# Patient Record
Sex: FEMALE | Race: WHITE | NOT HISPANIC OR LATINO | Employment: FULL TIME | URBAN - METROPOLITAN AREA
[De-identification: names, ages, dates, MRNs, and addresses within clinical notes are randomized per-mention and may not be internally consistent; named-entity substitution may affect disease eponyms.]

---

## 2017-06-04 ENCOUNTER — APPOINTMENT (OUTPATIENT)
Dept: CT IMAGING | Facility: HOSPITAL | Age: 37
End: 2017-06-04

## 2017-06-04 ENCOUNTER — HOSPITAL ENCOUNTER (EMERGENCY)
Facility: HOSPITAL | Age: 37
Discharge: HOME | End: 2017-06-04
Attending: EMERGENCY MEDICINE | Admitting: EMERGENCY MEDICINE

## 2017-06-04 VITALS
DIASTOLIC BLOOD PRESSURE: 81 MMHG | HEART RATE: 63 BPM | SYSTOLIC BLOOD PRESSURE: 116 MMHG | WEIGHT: 135 LBS | HEIGHT: 62 IN | BODY MASS INDEX: 24.84 KG/M2 | RESPIRATION RATE: 18 BRPM | TEMPERATURE: 98.2 F | OXYGEN SATURATION: 100 %

## 2017-06-04 DIAGNOSIS — N20.0 KIDNEY STONE ON LEFT SIDE: Primary | ICD-10-CM

## 2017-06-04 LAB
ALBUMIN SERPL-MCNC: 4.1 G/DL (ref 3.5–5.2)
ALBUMIN/GLOB SERPL: 1.4 G/DL
ALP SERPL-CCNC: 59 U/L (ref 40–129)
ALT SERPL W P-5'-P-CCNC: 12 U/L (ref 5–33)
ANION GAP SERPL CALCULATED.3IONS-SCNC: 11 MMOL/L
AST SERPL-CCNC: 15 U/L (ref 5–32)
BACTERIA UR QL AUTO: ABNORMAL /HPF
BASOPHILS # BLD AUTO: 0.05 10*3/MM3 (ref 0–0.2)
BASOPHILS NFR BLD AUTO: 0.8 % (ref 0–2)
BILIRUB SERPL-MCNC: 0.3 MG/DL (ref 0.2–1.2)
BILIRUB UR QL STRIP: NEGATIVE
BUN BLD-MCNC: 12 MG/DL (ref 6–20)
BUN/CREAT SERPL: 16.9 (ref 7–25)
CALCIUM SPEC-SCNC: 8.5 MG/DL (ref 8.6–10.5)
CHLORIDE SERPL-SCNC: 105 MMOL/L (ref 98–107)
CLARITY UR: CLEAR
CO2 SERPL-SCNC: 24 MMOL/L (ref 22–29)
COLOR UR: YELLOW
CREAT BLD-MCNC: 0.71 MG/DL (ref 0.57–1)
DEPRECATED RDW RBC AUTO: 38 FL (ref 37–54)
EOSINOPHIL # BLD AUTO: 0.51 10*3/MM3 (ref 0.1–0.3)
EOSINOPHIL NFR BLD AUTO: 8.5 % (ref 0–4)
ERYTHROCYTE [DISTWIDTH] IN BLOOD BY AUTOMATED COUNT: 11.9 % (ref 11.5–14.5)
GFR SERPL CREATININE-BSD FRML MDRD: 92 ML/MIN/1.73
GLOBULIN UR ELPH-MCNC: 3 GM/DL
GLUCOSE BLD-MCNC: 109 MG/DL (ref 65–99)
GLUCOSE UR STRIP-MCNC: NEGATIVE MG/DL
HCG SERPL QL: NEGATIVE
HCT VFR BLD AUTO: 37.9 % (ref 37–47)
HGB BLD-MCNC: 12.1 G/DL (ref 12–16)
HGB UR QL STRIP.AUTO: ABNORMAL
HYALINE CASTS UR QL AUTO: ABNORMAL /LPF
IMM GRANULOCYTES # BLD: 0.02 10*3/MM3 (ref 0–0.03)
IMM GRANULOCYTES NFR BLD: 0.3 % (ref 0–0.5)
KETONES UR QL STRIP: NEGATIVE
LEUKOCYTE ESTERASE UR QL STRIP.AUTO: NEGATIVE
LIPASE SERPL-CCNC: 45 U/L (ref 13–60)
LYMPHOCYTES # BLD AUTO: 2.23 10*3/MM3 (ref 0.6–4.8)
LYMPHOCYTES NFR BLD AUTO: 37.1 % (ref 20–45)
MCH RBC QN AUTO: 27.7 PG (ref 27–31)
MCHC RBC AUTO-ENTMCNC: 31.9 G/DL (ref 31–37)
MCV RBC AUTO: 86.7 FL (ref 81–99)
MONOCYTES # BLD AUTO: 0.4 10*3/MM3 (ref 0–1)
MONOCYTES NFR BLD AUTO: 6.7 % (ref 3–8)
NEUTROPHILS # BLD AUTO: 2.8 10*3/MM3 (ref 1.5–8.3)
NEUTROPHILS NFR BLD AUTO: 46.6 % (ref 45–70)
NITRITE UR QL STRIP: NEGATIVE
NRBC BLD MANUAL-RTO: 0 /100 WBC (ref 0–0)
PH UR STRIP.AUTO: 7.5 [PH] (ref 4.5–8)
PLATELET # BLD AUTO: 300 10*3/MM3 (ref 140–500)
PMV BLD AUTO: 10.2 FL (ref 7.4–10.4)
POTASSIUM BLD-SCNC: 3.5 MMOL/L (ref 3.5–5.2)
PROT SERPL-MCNC: 7.1 G/DL (ref 6–8.5)
PROT UR QL STRIP: ABNORMAL
RBC # BLD AUTO: 4.37 10*6/MM3 (ref 4.2–5.4)
RBC # UR: ABNORMAL /HPF
REF LAB TEST METHOD: ABNORMAL
SODIUM BLD-SCNC: 140 MMOL/L (ref 136–145)
SP GR UR STRIP: 1.02 (ref 1–1.03)
SQUAMOUS #/AREA URNS HPF: ABNORMAL /HPF
UROBILINOGEN UR QL STRIP: ABNORMAL
WBC NRBC COR # BLD: 6.01 10*3/MM3 (ref 4.8–10.8)
WBC UR QL AUTO: ABNORMAL /HPF

## 2017-06-04 PROCEDURE — 80053 COMPREHEN METABOLIC PANEL: CPT | Performed by: EMERGENCY MEDICINE

## 2017-06-04 PROCEDURE — 83690 ASSAY OF LIPASE: CPT | Performed by: EMERGENCY MEDICINE

## 2017-06-04 PROCEDURE — 96376 TX/PRO/DX INJ SAME DRUG ADON: CPT

## 2017-06-04 PROCEDURE — 84703 CHORIONIC GONADOTROPIN ASSAY: CPT | Performed by: EMERGENCY MEDICINE

## 2017-06-04 PROCEDURE — 25010000002 FENTANYL CITRATE (PF) 100 MCG/2ML SOLUTION: Performed by: EMERGENCY MEDICINE

## 2017-06-04 PROCEDURE — 85025 COMPLETE CBC W/AUTO DIFF WBC: CPT | Performed by: EMERGENCY MEDICINE

## 2017-06-04 PROCEDURE — 96375 TX/PRO/DX INJ NEW DRUG ADDON: CPT

## 2017-06-04 PROCEDURE — 74176 CT ABD & PELVIS W/O CONTRAST: CPT

## 2017-06-04 PROCEDURE — 25010000002 ONDANSETRON PER 1 MG: Performed by: EMERGENCY MEDICINE

## 2017-06-04 PROCEDURE — 25010000002 KETOROLAC TROMETHAMINE PER 15 MG: Performed by: EMERGENCY MEDICINE

## 2017-06-04 PROCEDURE — 81001 URINALYSIS AUTO W/SCOPE: CPT | Performed by: EMERGENCY MEDICINE

## 2017-06-04 PROCEDURE — 96374 THER/PROPH/DIAG INJ IV PUSH: CPT

## 2017-06-04 PROCEDURE — 99284 EMERGENCY DEPT VISIT MOD MDM: CPT | Performed by: EMERGENCY MEDICINE

## 2017-06-04 PROCEDURE — 99283 EMERGENCY DEPT VISIT LOW MDM: CPT

## 2017-06-04 RX ORDER — HYDROCODONE BITARTRATE AND ACETAMINOPHEN 5; 325 MG/1; MG/1
1-2 TABLET ORAL EVERY 4 HOURS PRN
Qty: 24 TABLET | Refills: 0 | Status: SHIPPED | OUTPATIENT
Start: 2017-06-04 | End: 2021-01-15

## 2017-06-04 RX ORDER — FENTANYL CITRATE 50 UG/ML
INJECTION, SOLUTION INTRAMUSCULAR; INTRAVENOUS
Status: DISCONTINUED
Start: 2017-06-04 | End: 2017-06-04 | Stop reason: HOSPADM

## 2017-06-04 RX ORDER — KETOROLAC TROMETHAMINE 30 MG/ML
30 INJECTION, SOLUTION INTRAMUSCULAR; INTRAVENOUS ONCE
Status: COMPLETED | OUTPATIENT
Start: 2017-06-04 | End: 2017-06-04

## 2017-06-04 RX ORDER — ONDANSETRON 4 MG/1
4 TABLET, ORALLY DISINTEGRATING ORAL 4 TIMES DAILY PRN
Qty: 12 TABLET | Refills: 0 | Status: SHIPPED | OUTPATIENT
Start: 2017-06-04 | End: 2021-01-15

## 2017-06-04 RX ORDER — DICLOFENAC SODIUM 75 MG/1
75 TABLET, DELAYED RELEASE ORAL 2 TIMES DAILY PRN
Qty: 20 TABLET | Refills: 0 | Status: SHIPPED | OUTPATIENT
Start: 2017-06-04 | End: 2021-01-15

## 2017-06-04 RX ORDER — SODIUM CHLORIDE 0.9 % (FLUSH) 0.9 %
10 SYRINGE (ML) INJECTION AS NEEDED
Status: DISCONTINUED | OUTPATIENT
Start: 2017-06-04 | End: 2017-06-04 | Stop reason: HOSPADM

## 2017-06-04 RX ORDER — FENTANYL CITRATE 50 UG/ML
50 INJECTION, SOLUTION INTRAMUSCULAR; INTRAVENOUS ONCE
Status: COMPLETED | OUTPATIENT
Start: 2017-06-04 | End: 2017-06-04

## 2017-06-04 RX ORDER — FENTANYL CITRATE 50 UG/ML
25 INJECTION, SOLUTION INTRAMUSCULAR; INTRAVENOUS ONCE
Status: COMPLETED | OUTPATIENT
Start: 2017-06-04 | End: 2017-06-04

## 2017-06-04 RX ORDER — CIPROFLOXACIN 250 MG/1
250 TABLET, FILM COATED ORAL 2 TIMES DAILY
Qty: 20 TABLET | Refills: 0 | Status: SHIPPED | OUTPATIENT
Start: 2017-06-04 | End: 2017-06-14

## 2017-06-04 RX ORDER — ONDANSETRON 2 MG/ML
4 INJECTION INTRAMUSCULAR; INTRAVENOUS ONCE
Status: COMPLETED | OUTPATIENT
Start: 2017-06-04 | End: 2017-06-04

## 2017-06-04 RX ADMIN — FENTANYL CITRATE 25 MCG: 50 INJECTION, SOLUTION INTRAMUSCULAR; INTRAVENOUS at 10:05

## 2017-06-04 RX ADMIN — KETOROLAC TROMETHAMINE 30 MG: 30 INJECTION INTRAMUSCULAR; INTRAVENOUS at 08:59

## 2017-06-04 RX ADMIN — ONDANSETRON 4 MG: 2 INJECTION, SOLUTION INTRAMUSCULAR; INTRAVENOUS at 08:10

## 2017-06-04 RX ADMIN — FENTANYL CITRATE 25 MCG: 50 INJECTION, SOLUTION INTRAMUSCULAR; INTRAVENOUS at 08:18

## 2017-06-04 NOTE — ED PROVIDER NOTES
"Subjective     History provided by:  Patient and spouse    History of Present Illness    · Chief complaint: Left flank and left abdominal pain    · Location: Flank and left lower quadrant of the abdomen    · Quality/Severity: Severe pain    · Timing/Onset: The pain began suddenly about an hour ago    · Modifying Factors: Walking exacerbates pain    · Associated symptoms: She denies any associated nausea, vomiting, or diarrhea.  She states she just got off her menstrual period and is unsure if she has any blood in her urine.    · Narrative: The patient is a 37-year-old white female complaining of a 1 hour onset is left flank and left lower quadrant abdominal pain.  The patient believes she has a kidney stone, but states she's never had a kidney stone before.  She denies a history of similar pain in the past.  She is sexually active without birth control, but states she's not attempting to get pregnant.  Her last menstrual period just finished a day ago.  The patient states that she is \"hypersensitive to pain medications\".    ED Triage Vitals   Temp Heart Rate Resp BP SpO2   06/04/17 0753 06/04/17 0753 06/04/17 0753 06/04/17 0753 06/04/17 0753   97.7 °F (36.5 °C) 54 18 138/96 100 %      Temp src Heart Rate Source Patient Position BP Location FiO2 (%)   06/04/17 0753 06/04/17 0753 06/04/17 0753 06/04/17 0753 --   Oral Monitor Sitting Right arm        Review of Systems   Constitutional: Negative for activity change, appetite change, chills, diaphoresis, fatigue and fever.   HENT: Negative for congestion, dental problem, ear pain, hearing loss, mouth sores, postnasal drip, rhinorrhea, sinus pressure, sore throat and voice change.    Eyes: Negative for photophobia, pain, discharge, redness and visual disturbance.   Respiratory: Negative for cough, chest tightness, shortness of breath, wheezing and stridor.    Cardiovascular: Negative for chest pain, palpitations and leg swelling.   Gastrointestinal: Positive for abdominal " pain. Negative for blood in stool, constipation, diarrhea, nausea and vomiting.   Genitourinary: Positive for flank pain. Negative for difficulty urinating, dysuria, frequency, hematuria, pelvic pain, urgency, vaginal bleeding and vaginal discharge.   Musculoskeletal: Negative for arthralgias, back pain, gait problem, joint swelling, myalgias, neck pain and neck stiffness.   Skin: Negative for color change and rash.   Neurological: Negative for dizziness, tremors, seizures, syncope, facial asymmetry, speech difficulty, weakness, light-headedness, numbness and headaches.   Hematological: Negative for adenopathy.   Psychiatric/Behavioral: Negative.  Negative for confusion and decreased concentration. The patient is not nervous/anxious.        No past medical history on file.    Allergies   Allergen Reactions   • Ceclor [Cefaclor]    • Percocet [Oxycodone-Acetaminophen] Hives   • Sulfa Antibiotics        Past Surgical History:   Procedure Laterality Date   • ESOPHAGEAL DILATATION         No family history on file.    Social History     Social History   • Marital status:      Spouse name: N/A   • Number of children: N/A   • Years of education: N/A     Social History Main Topics   • Smoking status: Never Smoker   • Smokeless tobacco: Not on file   • Alcohol use No   • Drug use: No   • Sexual activity: Yes     Partners: Male     Birth control/ protection: None     Other Topics Concern   • Not on file     Social History Narrative   • No narrative on file           Objective   Physical Exam   Constitutional: She is oriented to person, place, and time. She appears well-developed and well-nourished. She appears distressed.   The patient appears in discomfort, but does not appear toxic.   HENT:   Head: Normocephalic and atraumatic.   Nose: Nose normal.   Mouth/Throat: Oropharynx is clear and moist. No oropharyngeal exudate.   Eyes: EOM are normal. Pupils are equal, round, and reactive to light. Right eye exhibits no  discharge. Left eye exhibits no discharge. No scleral icterus.   Neck: Normal range of motion. Neck supple. No JVD present. No thyromegaly present.   Cardiovascular: Normal rate, regular rhythm and normal heart sounds.    No murmur heard.  Pulmonary/Chest: Effort normal and breath sounds normal. She has no wheezes. She has no rales. She exhibits no tenderness.   Abdominal: Soft. Bowel sounds are normal. She exhibits no distension. There is tenderness. There is no rebound and no guarding.   The patient's abdomen is soft but she has marked left lower quadrant abdominal tenderness.   Musculoskeletal: Normal range of motion. She exhibits no edema, tenderness or deformity.   Lymphadenopathy:     She has no cervical adenopathy.   Neurological: She is alert and oriented to person, place, and time. No cranial nerve deficit. Coordination normal.   No focal motor sensory deficit   Skin: Skin is warm and dry. No rash noted. She is not diaphoretic.   Psychiatric: Her behavior is normal. Judgment and thought content normal.   Nursing note and vitals reviewed.      Procedures         ED Course  ED Course   Comment By Time   Zofran 4mg and Fentanyl 50 mcg IV given in two 25mcg increments. Fabiano Pan MD 06/04 0816   [facility] Report [id number] Fabiano Pan MD 06/04 0820   09:00  the patient is still in pain, Toradol 30 mg IV. Fabiano Pan MD 06/04 0908   09:52   I informed the patient of her CT results showing a left distal ureter kidney stone, and recommended she follow-up with either Dr. Gunter or Dr. Mukherjee.  The patient still in significant discomfort - fentanyl 25 µg IV ordered. Fabiano Pan MD 06/04 0991   10:22  patient reports pain is better and she is ready for discharge. Fabiano Pan MD 06/04 1031                  MDM  Number of Diagnoses or Management Options  Kidney stone on left side: new and requires workup     Amount and/or Complexity of Data Reviewed  Clinical lab tests: ordered and reviewed  Tests in  the radiology section of CPT®: ordered and reviewed  Independent visualization of images, tracings, or specimens: yes    Risk of Complications, Morbidity, and/or Mortality  Presenting problems: high  Diagnostic procedures: high  Management options: high  General comments: My differential diagnosis for abdominal pain includes but is not limited to:  Gastritis, gastroenteritis, peptic ulcer disease, GERD, esophageal perforation, acute appendicitis, mesenteric adenitis, Meckel’s diverticulum, epiploic appendagitis, diverticulitis, colon cancer, ulcerative colitis, Crohn’s disease, intussusception, small bowel obstruction, adhesions, ischemic bowel, perforated viscus, ileus, obstipation, biliary colic, cholecystitis, cholelithiasis, Papa-Spike Pasquale, hepatitis, pancreatitis, common bile duct obstruction, cholangitis, bile leak, splenic trauma, splenic rupture, splenic infarction, splenic abscess, abdominal abscess, ascites, spontaneous bacterial peritonitis, hernia, UTI, cystitis, prostatitis, ureterolithiasis, urinary obstruction, ovarian cyst, torsion, pregnancy, ectopic pregnancy, PID, pelvic abscess, mittelschmerz, endometriosis, AAA, myocardial infarction, pneumonia, cancer, porphyria, DKA, medications, sickle cell, viral syndrome, zoster    Patient Progress  Patient progress: improved      Final diagnoses:   Kidney stone on left side           Labs Reviewed   URINALYSIS W/ CULTURE IF INDICATED - Abnormal; Notable for the following:        Result Value    Blood, UA Large (3+) (*)     Protein, UA 30 mg/dL (1+) (*)     All other components within normal limits   COMPREHENSIVE METABOLIC PANEL - Abnormal; Notable for the following:     Glucose 109 (*)     Calcium 8.5 (*)     All other components within normal limits   CBC WITH AUTO DIFFERENTIAL - Abnormal; Notable for the following:     Eosinophil % 8.5 (*)     Eosinophils, Absolute 0.51 (*)     All other components within normal limits   URINALYSIS, MICROSCOPIC ONLY -  Abnormal; Notable for the following:     RBC, UA 21-30 (*)     All other components within normal limits   LIPASE - Normal   HCG, SERUM, QUALITATIVE - Normal   CBC AND DIFFERENTIAL    Narrative:     The following orders were created for panel order CBC & Differential.  Procedure                               Abnormality         Status                     ---------                               -----------         ------                     CBC Auto Differential[[id number]]        Abnormal            Final result                 Please view results for these tests on the individual orders.     CT Abdomen Pelvis Without Contrast   ED Interpretation   1. Mild left hydronephrosis secondary to a 2 mm stone in the distal left   ureter. No other stones are identified.   2. Normal unopacified GI tract, including the appendix.       This report was finalized on 6/4/2017 9:37 AM by Dr. Librado Stewart MD.          Final Result   1. Mild left hydronephrosis secondary to a 2 mm stone in the distal left   ureter. No other stones are identified.   2. Normal unopacified GI tract, including the appendix.       This report was finalized on 6/4/2017 9:37 AM by Dr. Librado Stewart MD.                 Medication List      New Prescriptions          ciprofloxacin 250 MG tablet   Commonly known as:  CIPRO   Take 1 tablet by mouth 2 (Two) Times a Day for 10 days.       diclofenac 75 MG EC tablet   Commonly known as:  VOLTAREN   Take 1 tablet by mouth 2 (Two) Times a Day As Needed (Pain) for up to 20   doses.       HYDROcodone-acetaminophen 5-325 MG per tablet   Commonly known as:  NORCO   Take 1-2 tablets by mouth Every 4 (Four) Hours As Needed for Severe Pain   (7-10) for up to 24 doses.       ondansetron ODT 4 MG disintegrating tablet   Commonly known as:  ZOFRAN-ODT   Take 1 tablet by mouth 4 (Four) Times a Day As Needed for Nausea or   Vomiting for up to 12 doses.                Fabiano Pan MD  06/04/17 3013

## 2019-09-27 ENCOUNTER — APPOINTMENT (OUTPATIENT)
Dept: WOMENS IMAGING | Facility: HOSPITAL | Age: 39
End: 2019-09-27

## 2019-09-27 PROCEDURE — 77067 SCR MAMMO BI INCL CAD: CPT | Performed by: RADIOLOGY

## 2019-09-27 PROCEDURE — 77063 BREAST TOMOSYNTHESIS BI: CPT | Performed by: RADIOLOGY

## 2019-10-07 ENCOUNTER — APPOINTMENT (OUTPATIENT)
Dept: WOMENS IMAGING | Facility: HOSPITAL | Age: 39
End: 2019-10-07

## 2019-10-07 PROCEDURE — 76641 ULTRASOUND BREAST COMPLETE: CPT | Performed by: RADIOLOGY

## 2019-10-07 PROCEDURE — G0279 TOMOSYNTHESIS, MAMMO: HCPCS | Performed by: RADIOLOGY

## 2019-10-07 PROCEDURE — 77061 BREAST TOMOSYNTHESIS UNI: CPT | Performed by: RADIOLOGY

## 2019-10-07 PROCEDURE — 77065 DX MAMMO INCL CAD UNI: CPT | Performed by: RADIOLOGY

## 2020-04-25 ENCOUNTER — HOSPITAL ENCOUNTER (EMERGENCY)
Facility: HOSPITAL | Age: 40
Discharge: HOME | End: 2020-04-25
Attending: EMERGENCY MEDICINE | Admitting: EMERGENCY MEDICINE

## 2020-04-25 VITALS
BODY MASS INDEX: 24.69 KG/M2 | SYSTOLIC BLOOD PRESSURE: 118 MMHG | TEMPERATURE: 96.9 F | HEIGHT: 62 IN | HEART RATE: 78 BPM | DIASTOLIC BLOOD PRESSURE: 80 MMHG | OXYGEN SATURATION: 100 % | RESPIRATION RATE: 16 BRPM

## 2020-04-25 DIAGNOSIS — F32.1 CURRENT MODERATE EPISODE OF MAJOR DEPRESSIVE DISORDER WITHOUT PRIOR EPISODE (HCC): Primary | ICD-10-CM

## 2020-04-25 LAB
AMPHET+METHAMPHET UR QL: POSITIVE
BARBITURATES UR QL SCN: NEGATIVE
BENZODIAZ UR QL SCN: NEGATIVE
CANNABINOIDS SERPL QL: NEGATIVE
COCAINE UR QL: NEGATIVE
ETHANOL BLD-MCNC: <10 MG/DL (ref 0–10)
ETHANOL UR QL: <0.01 %
HCG SERPL QL: NEGATIVE
HOLD SPECIMEN: NORMAL
METHADONE UR QL SCN: NEGATIVE
OPIATES UR QL: NEGATIVE
OXYCODONE UR QL SCN: NEGATIVE
WHOLE BLOOD HOLD SPECIMEN: NORMAL

## 2020-04-25 PROCEDURE — 80307 DRUG TEST PRSMV CHEM ANLYZR: CPT

## 2020-04-25 PROCEDURE — 84703 CHORIONIC GONADOTROPIN ASSAY: CPT

## 2020-04-25 PROCEDURE — 99283 EMERGENCY DEPT VISIT LOW MDM: CPT
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
## 2020-04-25 NOTE — ED PROVIDER NOTES
EMERGENCY DEPARTMENT ENCOUNTER    Room Number:  10/10  Date of encounter:  4/25/2020  PCP: María Martino APRN  Historian: Patient      HPI:  Chief Complaint: Depression  A complete HPI/ROS/PMH/PSH/SH/FH are unobtainable due to: Nothing    Context: Vianey Bonilla is a 39 y.o. female who presents to the ED c/o severe depression related to recent marital and child custody problems.  Patient says she has been getting increasingly depressed and it is now severe.  She is having trouble sleeping, not eating and drinking much, having trouble focusing and also having severe anxiety.  She has no suicidal ideations, and appears very cooperative and is here under her own discretion.      PAST MEDICAL HISTORY  Active Ambulatory Problems     Diagnosis Date Noted   • No Active Ambulatory Problems     Resolved Ambulatory Problems     Diagnosis Date Noted   • No Resolved Ambulatory Problems     No Additional Past Medical History         PAST SURGICAL HISTORY  Past Surgical History:   Procedure Laterality Date   • ESOPHAGEAL DILATATION           FAMILY HISTORY  No family history on file.      SOCIAL HISTORY  Social History     Socioeconomic History   • Marital status:      Spouse name: Not on file   • Number of children: Not on file   • Years of education: Not on file   • Highest education level: Not on file   Tobacco Use   • Smoking status: Never Smoker   Substance and Sexual Activity   • Alcohol use: No   • Drug use: No   • Sexual activity: Yes     Partners: Male     Birth control/protection: None         ALLERGIES  Ceclor [cefaclor]; Percocet [oxycodone-acetaminophen]; and Sulfa antibiotics        REVIEW OF SYSTEMS  Review of Systems     All systems reviewed and negative except for those discussed in HPI.       PHYSICAL EXAM    I have reviewed the triage vital signs and nursing notes.    ED Triage Vitals [04/25/20 1425]   Temp Heart Rate Resp BP SpO2   96.9 °F (36.1 °C) 90 16 -- 100 %      Temp src Heart Rate  Source Patient Position BP Location FiO2 (%)   Tympanic Monitor -- -- --       Physical Exam  GENERAL: Emotional, but cooperative  HENT: nares patent  EYES: no scleral icterus  CV: regular rhythm, regular rate  RESPIRATORY: normal effort  ABDOMEN: soft  MUSCULOSKELETAL: no deformity  NEURO: alert, moves all extremities, follows commands.  No acute suicidal ideations.  Although the patient is depressed and going through a lot of stress, she does appear to be very thoughtful and self-aware.  SKIN: warm, dry        LAB RESULTS  Recent Results (from the past 24 hour(s))   Ethanol    Collection Time: 04/25/20  2:42 PM   Result Value Ref Range    Ethanol <10 0 - 10 mg/dL    Ethanol % <0.010 %   Urine Drug Screen - Urine, Clean Catch    Collection Time: 04/25/20  2:42 PM   Result Value Ref Range    Amphet/Methamphet, Screen Positive (A) Negative    Barbiturates Screen, Urine Negative Negative    Benzodiazepine Screen, Urine Negative Negative    Cocaine Screen, Urine Negative Negative    Opiate Screen Negative Negative    THC, Screen, Urine Negative Negative    Methadone Screen, Urine Negative Negative    Oxycodone Screen, Urine Negative Negative   hCG, Serum, Qualitative    Collection Time: 04/25/20  2:42 PM   Result Value Ref Range    HCG Qualitative Negative Negative   Green Top (Gel)    Collection Time: 04/25/20  2:42 PM   Result Value Ref Range    Extra Tube Hold for add-ons.    Lavender Top    Collection Time: 04/25/20  2:42 PM   Result Value Ref Range    Extra Tube hold for add-on        Ordered the above labs and independently reviewed the results.        RADIOLOGY  No Radiology Exams Resulted Within Past 24 Hours    I ordered the above noted radiological studies. Reviewed by me and discussed with radiologist.  See dictation for official radiology interpretation.      PROCEDURES    Procedures      MEDICATIONS GIVEN IN ER    Medications - No data to display      PROGRESS, DATA ANALYSIS, CONSULTS, AND MEDICAL DECISION  MAKING    All labs have been independently reviewed by me.  All radiology studies have been reviewed by me and discussed with radiologist dictating the report.   EKG's independently viewed and interpreted by me.  Discussion below represents my analysis of pertinent findings related to patient's condition, differential diagnosis, treatment plan and final disposition.        ED Course as of Apr 25 2345   Sat Apr 25, 2020 2344 hCG negative, EtOH negative, UDS positive for amphetamines.  She does not appear to be acutely intoxicated with stimulant.    Patient has been evaluated by the Access team and they feel she is stable for discharge home in intensive outpatient therapy.  Patient is agreeable with this and that seems like a reasonable plan    [DP]      ED Course User Index  [DP] Ugo Pa MD               AS OF 23:45 VITALS:    BP - 118/80  HR - 78  TEMP - 96.9 °F (36.1 °C) (Tympanic)  O2 SATS - 100%        DIAGNOSIS  Final diagnoses:   Current moderate episode of major depressive disorder without prior episode (CMS/[facility])         DISPOSITION  Discharge           Ugo Pa MD  04/25/20 0163

## 2020-04-25 NOTE — ED TRIAGE NOTES
Pt seeking behavioral health evaluation for depression, trouble sleeping, poor PO intake, lack of focus and anxiety. Denies SI/HI. Pt states that she is going through a divorce. Mask applied at triage.
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
## 2020-04-27 ENCOUNTER — OFFICE VISIT (OUTPATIENT)
Dept: PSYCHIATRY | Facility: HOSPITAL | Age: 40
End: 2020-04-27

## 2020-04-27 DIAGNOSIS — F33.1 MODERATE EPISODE OF RECURRENT MAJOR DEPRESSIVE DISORDER (HCC): Primary | ICD-10-CM
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
## 2020-04-27 NOTE — PROGRESS NOTES
"Wrap-up  Mood at 5 with 10 being the worse and found \"hearing I am not alone\"  As most helpful today.    Feeling sad or empty   Easily tearing up/crying  Increased nervous feelings/anxiety    No current SI    Good participation    Has appropriate goal for later today  "

## 2020-04-28 ENCOUNTER — OFFICE VISIT (OUTPATIENT)
Dept: PSYCHIATRY | Facility: HOSPITAL | Age: 40
End: 2020-04-28

## 2020-04-28 DIAGNOSIS — F33.1 MODERATE EPISODE OF RECURRENT MAJOR DEPRESSIVE DISORDER (HCC): Primary | ICD-10-CM

## 2020-04-28 PROCEDURE — S9480 INTENSIVE OUTPATIENT PSYCHIA: HCPCS | Performed by: COUNSELOR

## 2020-04-28 NOTE — PROGRESS NOTES
Other     Information/activity     2810-8400 Art Therapy - Therapeutic story [employer] [facility] [employer], creation of image of story and processing    Instructor Hermilo Sotelo, [facility]     14:16     Patient Response Good participation

## 2020-04-28 NOTE — PROGRESS NOTES
Wrap-up  Mood at 7  with 10 being the worse and found group therapy to be most helpful today    Reporting no depressive symptoms today.    No SI    Has appropriate goals for later today

## 2020-04-28 NOTE — PROGRESS NOTES
Group therapy [id number]  Pt was very engaged both giving and receiving feedback today.  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   Pt also discussed a stressful situation involving her Dad possibly starting a new position where he might be more exposed to Covid-19.

## 2020-04-29 ENCOUNTER — OFFICE VISIT (OUTPATIENT)
Dept: PSYCHIATRY | Facility: HOSPITAL | Age: 40
End: 2020-04-29

## 2020-04-29 DIAGNOSIS — F33.1 MODERATE EPISODE OF RECURRENT MAJOR DEPRESSIVE DISORDER (HCC): Primary | ICD-10-CM
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
## 2020-04-29 NOTE — PROGRESS NOTES
Group therapy [id number]  Mood at 8 with 10 being the worse amd found group therapy to be most helpful    Described no depressive symptoms today    No SI    Appropriate goals for later today

## 2020-04-29 NOTE — PROGRESS NOTES
"Group therapy [id number]  Pt identified a myriad of feelings today as she discussed enduring many challenging during her 20 year relationship with her .  \" I was there for him during his depression and h losses of his parents.  Now when I need him, he is abandoning me.\"  Pt was attentive and offered words of encouragement to other group members today.  "

## 2020-04-29 NOTE — PROGRESS NOTES
Mental Health Awareness Class   (10:30am-11:30am)  Information/activity     Managing Stress    Instructor Jessica Joel LCSW     13:52     Patient Response Good participation

## 2020-04-30 ENCOUNTER — OFFICE VISIT (OUTPATIENT)
Dept: PSYCHIATRY | Facility: HOSPITAL | Age: 40
End: 2020-04-30

## 2020-04-30 DIAGNOSIS — F33.1 MODERATE EPISODE OF RECURRENT MAJOR DEPRESSIVE DISORDER (HCC): Primary | ICD-10-CM

## 2020-04-30 PROCEDURE — S9480 INTENSIVE OUTPATIENT PSYCHIA: HCPCS

## 2020-04-30 NOTE — PROGRESS NOTES
Wrap Up:  Mood at 9 with 10 being the worse and reports group was most helpful today.  Pt reports no depressive symptoms and no SI.  Pt has appropriate goals for the day.  May be out tomorrow for a scheduled court date but plans to return on Monday 5/4.

## 2020-04-30 NOTE — PROGRESS NOTES
"Group Note:  Pt shared of a court date scheduled for tomorrow related to the DVO, but her  has COVID symptoms.  Pt shared feeling sad that she has not seen her children in several months, but is tired of being the \"bad agustín\" in her relationship with her .  Pt is scheduled to begin anger management classes next week and she is hopeful that IOP and the classes will help her be a better mother and person.  Encouraged pt to focus on things she can control right now.  Pt wants to be supportive of peers, but is quick to provide advice and solutions.  Encouraged pt to use \"I\" statements, and will continue to encourage connecting to other's feelings.    "

## 2020-05-01 ENCOUNTER — OFFICE VISIT (OUTPATIENT)
Dept: PSYCHIATRY | Facility: HOSPITAL | Age: 40
End: 2020-05-01

## 2020-05-01 DIAGNOSIS — F33.1 MODERATE EPISODE OF RECURRENT MAJOR DEPRESSIVE DISORDER (HCC): Primary | ICD-10-CM

## 2020-05-01 PROCEDURE — S9480 INTENSIVE OUTPATIENT PSYCHIA: HCPCS

## 2020-05-01 NOTE — PROGRESS NOTES
Wrap Up:  Mood at 2 with 10 being the worse and reports group was most helpful today.  Pt reports following depressive symptoms:    Easily tearing up/crying     Pt denies SI and has appropriate goals for the day.  Plans to return on Monday 5/4.

## 2020-05-01 NOTE — PROGRESS NOTES
Mental Health Awareness Class   (5092-2442)  Information/activity     SAFETY PLANNING    Instructor Cat Quintero LCSW     10:15     Patient Response Good participation

## 2020-05-01 NOTE — PROGRESS NOTES
Group Note:  Pt shared feelings of grief related to her marriage.  She reports feeling conflicted by feelings of love for her  and their old life, but recognizes she cannot change him or make him want to change.  Pt was tearful and received supportive feedback from peers.

## 2020-05-04 ENCOUNTER — OFFICE VISIT (OUTPATIENT)
Dept: PSYCHIATRY | Facility: HOSPITAL | Age: 40
End: 2020-05-04

## 2020-05-04 DIAGNOSIS — F43.23 ADJUSTMENT DISORDER WITH MIXED ANXIETY AND DEPRESSED MOOD: Primary | ICD-10-CM

## 2020-05-04 PROCEDURE — S9480 INTENSIVE OUTPATIENT PSYCHIA: HCPCS | Performed by: COUNSELOR

## 2020-05-04 NOTE — PROGRESS NOTES
Other     Information/activity     [facility] Skills Class    Instructor Rose Marie Dunn, [facility]     13:18     Patient Response Good participation

## 2020-05-04 NOTE — PROGRESS NOTES
Group therapy [id number]  Pt shared of a very good weekend.  Reported spent time with friends and family and focused on self-care.  Was attentive and offered support to peers today.

## 2020-05-04 NOTE — PROGRESS NOTES
Wrap-up  Mood at 4  with 10 being the worse    Reported no depressive symptoms today  NO SI    Good participation    Bright affect and has goal to do work at home for the remainder of the day

## 2020-05-05 ENCOUNTER — OFFICE VISIT (OUTPATIENT)
Dept: PSYCHIATRY | Facility: HOSPITAL | Age: 40
End: 2020-05-05

## 2020-05-05 DIAGNOSIS — F43.23 ADJUSTMENT DISORDER WITH MIXED ANXIETY AND DEPRESSED MOOD: Primary | ICD-10-CM

## 2020-05-05 PROCEDURE — S9480 INTENSIVE OUTPATIENT PSYCHIA: HCPCS | Performed by: COUNSELOR

## 2020-05-05 NOTE — PROGRESS NOTES
"Group therapy [id number]  Shared of a good day yesterday-\"rather boring because all I did was work after I left here.  States she has learned a good system to work from home and appreciates the opportunity to do do.  Pt was attentive and offered suggestions to peers.  "

## 2020-05-05 NOTE — PROGRESS NOTES
Wrap-up  Mood at 2 with 10 being the worse    No depressive symptoms reported today    No SI    Good participation    Has goal to work after IOP today

## 2020-05-05 NOTE — PROGRESS NOTES
Mental Health Awareness Class   (10:30am-11:30am)  Information/activity     Finding Balance    Instructor Jessica Joel, DERIK     14:44     Patient Response Good participation

## 2020-05-06 ENCOUNTER — OFFICE VISIT (OUTPATIENT)
Dept: PSYCHIATRY | Facility: HOSPITAL | Age: 40
End: 2020-05-06

## 2020-05-06 DIAGNOSIS — F43.23 ADJUSTMENT DISORDER WITH MIXED ANXIETY AND DEPRESSED MOOD: Primary | ICD-10-CM

## 2020-05-06 PROCEDURE — S9480 INTENSIVE OUTPATIENT PSYCHIA: HCPCS | Performed by: COUNSELOR

## 2020-05-06 NOTE — PROGRESS NOTES
Other     Information/activity     [id number] Art Therapy - Creation of creature as metaphor for 'Inner Critic', writing of inner negative self-talk, then writing a letter to the creature    Instructor Hermilo Sotelo LPAT     13:17     Patient Response Good participation

## 2020-05-06 NOTE — PROGRESS NOTES
Wrap-up  Mood at 7  with 10 being the worse and found group therapy to be most helpful today.  No depressive symptoms reported today.    No SI    Appropriate goals for later today.

## 2020-05-06 NOTE — PROGRESS NOTES
Group therapy   Pt shared of receiving information from her 's  yesterday which was very upsetting.  Pt able to reach out to her father and felt better.  She has been active in group today both giving and receiving feedback. Planning to start an anger management program next week.

## 2020-05-06 NOTE — PROGRESS NOTES
Treatment team met.  Pt continues in [facility] dealing with an adjustment disorder after a marital separation and  having filed a DVO.  Pt reporting improved mood and no current depressive symptoms. Has been active in classes and groups.  Reaching out to friends and using positive self-talk as coping skills.  Has an  to represent pt with the next court date of 5/15/20.  Pt has plans to attend an anger management program after d/c from [facility] (planned for 5/8/20).

## 2020-05-07 ENCOUNTER — OFFICE VISIT (OUTPATIENT)
Dept: PSYCHIATRY | Facility: HOSPITAL | Age: 40
End: 2020-05-07

## 2020-05-07 DIAGNOSIS — F43.23 ADJUSTMENT DISORDER WITH MIXED ANXIETY AND DEPRESSED MOOD: Primary | ICD-10-CM

## 2020-05-07 PROCEDURE — S9480 INTENSIVE OUTPATIENT PSYCHIA: HCPCS

## 2020-05-07 NOTE — PROGRESS NOTES
Teaching Record:  Information / activity:  Alcohol Education, Process Addictions and Marijuana Education    Good participation   [id number]      TIMO DalyW

## 2020-05-07 NOTE — PROGRESS NOTES
Wrap Up:  Mood at 2 with 10 being the worse and reports group therapy was most helpful today.  She denies any depressive symptoms and no SI. Pt has appropriate goals for the day and plans to d/c tomorrow.

## 2020-05-07 NOTE — PROGRESS NOTES
"Group Note:  Pt's affect was bright and she related well to peers.  She met with an \"anger management counselor\" yesterday for an evaluation and reports the counselor said she does not have any anger management issues.  She shared feeling validated that several mental health professionals have told her she is not mentally unstable, but has been having a normal reaction to the more recent trauma in her marriage.  Pt verbalized understanding that she cannot change her , but wants to focus on taking care of herself.  She is hopeful about her court date next week, and plans to discharge tomorrow.   "

## 2020-05-08 ENCOUNTER — OFFICE VISIT (OUTPATIENT)
Dept: PSYCHIATRY | Facility: HOSPITAL | Age: 40
End: 2020-05-08

## 2020-05-08 DIAGNOSIS — F43.23 ADJUSTMENT DISORDER WITH MIXED ANXIETY AND DEPRESSED MOOD: Primary | ICD-10-CM

## 2020-05-08 PROCEDURE — S9480 INTENSIVE OUTPATIENT PSYCHIA: HCPCS

## 2020-05-08 NOTE — PROGRESS NOTES
Discharge Summary    Vianey attended  10 Sessions         Registration Date 4/27/2020  Discharge Date  5/8/2020       Summary of Treatment and Progress towards goals:  Vianey participated in IOP of her own accord for support dealing with depression/anxiety due to martial stressors and a DVO filed by her  and not being allowed to see her children since December 2019.  Pt was engaged in groups and classes and was focused on managing the things she can control currently rather than things are not within her control.  She related well to peers in group and at time of discharge was reporting no depressive symptoms.      Diagnostic Impression:  Adjustment disorder with mixed mood of anxiety and depression      Current Medications: Pt is not currently on any psychotropic medications      Referrals/ Appointments :   Patient sees therapist, Radha Sesay LCSW, and will schedule her next appointment next week.         Cat Quintero LCSW

## 2020-05-08 NOTE — PROGRESS NOTES
[id number] Psych IOP Class     Class topic: emotional mastery with PUMA talk about topic     Class participation: pt listened quietly; did not share thoughts on topic

## 2020-05-08 NOTE — PROGRESS NOTES
Group Note:  Pt shared with group that she will d/c from IOP today.  She shared feeling hopeful about her court date next week and her appreciation for peers in group.  Pt plans to continue utilizing her coping skills of reaching out to friends, running, baking, reading and seeing her ongoing therapist.

## 2020-05-11 ENCOUNTER — APPOINTMENT (OUTPATIENT)
Dept: PSYCHIATRY | Facility: HOSPITAL | Age: 40
End: 2020-05-11

## 2020-05-12 ENCOUNTER — APPOINTMENT (OUTPATIENT)
Dept: PSYCHIATRY | Facility: HOSPITAL | Age: 40
End: 2020-05-12

## 2020-05-13 ENCOUNTER — APPOINTMENT (OUTPATIENT)
Dept: PSYCHIATRY | Facility: HOSPITAL | Age: 40
End: 2020-05-13

## 2020-05-14 ENCOUNTER — APPOINTMENT (OUTPATIENT)
Dept: PSYCHIATRY | Facility: HOSPITAL | Age: 40
End: 2020-05-14

## 2020-05-15 ENCOUNTER — APPOINTMENT (OUTPATIENT)
Dept: PSYCHIATRY | Facility: HOSPITAL | Age: 40
End: 2020-05-15

## 2020-05-18 ENCOUNTER — APPOINTMENT (OUTPATIENT)
Dept: PSYCHIATRY | Facility: HOSPITAL | Age: 40
End: 2020-05-18

## 2020-05-19 ENCOUNTER — APPOINTMENT (OUTPATIENT)
Dept: PSYCHIATRY | Facility: HOSPITAL | Age: 40
End: 2020-05-19

## 2021-01-14 ENCOUNTER — TELEPHONE (OUTPATIENT)
Dept: INTERNAL MEDICINE | Facility: CLINIC | Age: 41
End: 2021-01-14

## 2021-01-14 NOTE — TELEPHONE ENCOUNTER
CALLED PATIENT TO LET HER KNOW HER APPOINTMENT WAS CHANGED TO A TELEPHONE VISIT DUE TO  BEING EXPOSED TO COVID

## 2021-01-15 ENCOUNTER — TELEPHONE (OUTPATIENT)
Dept: INTERNAL MEDICINE | Facility: CLINIC | Age: 41
End: 2021-01-15

## 2021-01-15 ENCOUNTER — OFFICE VISIT (OUTPATIENT)
Dept: INTERNAL MEDICINE | Facility: CLINIC | Age: 41
End: 2021-01-15

## 2021-01-15 VITALS — TEMPERATURE: 97.8 F | HEIGHT: 63 IN | WEIGHT: 135 LBS | BODY MASS INDEX: 23.92 KG/M2

## 2021-01-15 DIAGNOSIS — F98.8 ATTENTION DEFICIT DISORDER (ADD) IN ADULT: Primary | Chronic | ICD-10-CM

## 2021-01-15 DIAGNOSIS — F41.9 ANXIETY AND DEPRESSION: Chronic | ICD-10-CM

## 2021-01-15 DIAGNOSIS — Z87.11 HISTORY OF GASTRIC ULCER: ICD-10-CM

## 2021-01-15 DIAGNOSIS — G43.009 MIGRAINE WITHOUT AURA AND WITHOUT STATUS MIGRAINOSUS, NOT INTRACTABLE: Chronic | ICD-10-CM

## 2021-01-15 DIAGNOSIS — F32.A ANXIETY AND DEPRESSION: Chronic | ICD-10-CM

## 2021-01-15 PROBLEM — K26.9 DUODENAL ULCER: Status: ACTIVE | Noted: 2018-08-30

## 2021-01-15 PROBLEM — K22.2 ESOPHAGEAL STRICTURE: Status: ACTIVE | Noted: 2018-08-29

## 2021-01-15 PROCEDURE — 99443 PR PHYS/QHP TELEPHONE EVALUATION 21-30 MIN: CPT | Performed by: INTERNAL MEDICINE

## 2021-01-15 RX ORDER — BUPROPION HYDROCHLORIDE 300 MG/1
300 TABLET ORAL DAILY
COMMUNITY
End: 2021-01-15 | Stop reason: SDUPTHER

## 2021-01-15 RX ORDER — DEXTROAMPHETAMINE SACCHARATE, AMPHETAMINE ASPARTATE MONOHYDRATE, DEXTROAMPHETAMINE SULFATE AND AMPHETAMINE SULFATE 2.5; 2.5; 2.5; 2.5 MG/1; MG/1; MG/1; MG/1
10 CAPSULE, EXTENDED RELEASE ORAL EVERY MORNING
Qty: 30 CAPSULE | Refills: 0 | Status: SHIPPED | OUTPATIENT
Start: 2021-01-15 | End: 2021-03-03 | Stop reason: SDUPTHER

## 2021-01-15 RX ORDER — BUPROPION HYDROCHLORIDE 75 MG/1
75 TABLET ORAL
COMMUNITY
End: 2021-01-15 | Stop reason: DRUGHIGH

## 2021-01-15 RX ORDER — HYDROXYZINE HYDROCHLORIDE 10 MG/1
10 TABLET, FILM COATED ORAL 3 TIMES DAILY PRN
COMMUNITY
End: 2021-04-27 | Stop reason: SDUPTHER

## 2021-01-15 RX ORDER — PANTOPRAZOLE SODIUM 40 MG/1
TABLET, DELAYED RELEASE ORAL
COMMUNITY
Start: 2020-10-12 | End: 2021-01-15 | Stop reason: SDUPTHER

## 2021-01-15 RX ORDER — SUMATRIPTAN 25 MG/1
25 TABLET, FILM COATED ORAL
COMMUNITY

## 2021-01-15 RX ORDER — DEXTROAMPHETAMINE SACCHARATE, AMPHETAMINE ASPARTATE, DEXTROAMPHETAMINE SULFATE AND AMPHETAMINE SULFATE 2.5; 2.5; 2.5; 2.5 MG/1; MG/1; MG/1; MG/1
10 TABLET ORAL DAILY
COMMUNITY
End: 2021-01-15 | Stop reason: DRUGHIGH

## 2021-01-15 RX ORDER — BUPROPION HYDROCHLORIDE 300 MG/1
300 TABLET ORAL DAILY
Qty: 90 TABLET | Refills: 1 | Status: SHIPPED | OUTPATIENT
Start: 2021-01-15 | End: 2021-04-27 | Stop reason: SDUPTHER

## 2021-01-15 RX ORDER — PANTOPRAZOLE SODIUM 40 MG/1
40 TABLET, DELAYED RELEASE ORAL DAILY
Qty: 90 TABLET | Refills: 1 | Status: SHIPPED | OUTPATIENT
Start: 2021-01-15 | End: 2021-10-27 | Stop reason: SDUPTHER

## 2021-01-15 NOTE — TELEPHONE ENCOUNTER
Caller: Vianey Bonilla    Relationship to patient: Self    Best call back number: [phone]    Patient is needing: PATIENT'S FATHER - ALBINO KEMP - SAID THAT HE HAS BEEN RECEIVING CALLS REGARDING THE PATIENT AND WANTED TO MAKE SURE WE HAVE HER PHONE NUMBER. HE SAID THAT PATIENT CAN BE REACHED [phone].

## 2021-01-15 NOTE — ASSESSMENT & PLAN NOTE
CONTROLLED  - cont high dose PPI once daily--> refills sent today  - reviewed reflux precautions and dietary trigger avoidance

## 2021-01-15 NOTE — ASSESSMENT & PLAN NOTE
CONTROLLED  - managing without medications at this time  - has imitrex for prn use, has not needed  - call if frequency or intensity increases for medication adjustment

## 2021-01-15 NOTE — ASSESSMENT & PLAN NOTE
CONTROLLED  - cont on wellbutrin once daily, tolerating well with desired effects, refills sent today  - reviewed potential side effects, pt denies any of these at this time  - cont working with counselor, currently seeing every other week

## 2021-01-15 NOTE — PROGRESS NOTES
"Chief Complaint  Med Refill, ADD, and Anxiety     You have chosen to receive care through a telephone visit. Do you consent to use a telephone visit for your medical care today? Yes      Subjective          Vianey Bonilla presents to [facility] for med refills and follow up.   Is currently taking adderall 10 mg once daily for ADD. Pt currently takes medication early in the morning but feels it wear off around lunch, currently working until 7-8 pm each night. Has tried XR in the past and doesn't remember side effects, but was switched to short acting based on needs from her job previously. No chest pain, headaches, tremors from her current med. No issues falling asleep at night.   Still seeing counselor every other week. Takes wellbutrin daily and at this time feels like it manages her anxiety and depression well. Still has not seen her daughters related to a custody limon.   She is still taking PPI once daily for gastritis symptoms with history of PUD. No bleeding incidents, no hematemesis. No melena or hematochezia.   Has imitrex for migraines as needed, is not having migraines currently and is not actively needing.     Objective   Vital Signs:     Temp 97.8 °F (36.6 °C)   Ht 160 cm (63\")   Wt 61.2 kg (135 lb)   BMI 23.91 kg/m²         Result Review : : None           Assessment and Plan      Diagnoses and all orders for this visit:    1. Attention deficit disorder (ADD) in adult (Primary)  Assessment & Plan:  UNCONTROLLED  - pt currently on short acting adderall once daily that she takes only on work days, but notes that the effects of the medication wear off early in the day and she often works very late  - will transition from IR to XR formulation, maintain dose at 10 mg, Rx sent today  - Reviewed controlled nature of substance, potential for abuse/addiction, and possible adverse effects of medication. No red flags for abuse at this time.   - Controlled " substances contract signed and in chart.   - Annual UDS screening done and appropriately neg 2/2020, will repeat at next in person encounter.   - Johnathon checked and appropriate.         Orders:  -     amphetamine-dextroamphetamine XR (Adderall XR) 10 MG 24 hr capsule; Take 1 capsule by mouth Every Morning  Dispense: 30 capsule; Refill: 0    2. Anxiety and depression  Assessment & Plan:  CONTROLLED  - cont on wellbutrin once daily, tolerating well with desired effects, refills sent today  - reviewed potential side effects, pt denies any of these at this time  - cont working with counselor, currently seeing every other week    Orders:  -     buPROPion XL (WELLBUTRIN XL) 300 MG 24 hr tablet; Take 1 tablet by mouth Daily.  Dispense: 90 tablet; Refill: 1    3. History of gastric ulcer  Assessment & Plan:  CONTROLLED  - cont high dose PPI once daily--> refills sent today  - reviewed reflux precautions and dietary trigger avoidance    Orders:  -     pantoprazole (PROTONIX) 40 MG EC tablet; Take 1 tablet by mouth Daily.  Dispense: 90 tablet; Refill: 1    4. Migraine without aura and without status migrainosus, not intractable  Assessment & Plan:  CONTROLLED  - managing without medications at this time  - has imitrex for prn use, has not needed  - call if frequency or intensity increases for medication adjustment        I spent 21 minutes caring for Vianey on this date of service. This time includes time spent by me in the following activities:teleconference    Follow Up   Return in about 1 month (around 2/15/2021) for follow up ADHD med change.    Patient was given instructions and counseling regarding her condition or for health maintenance advice. Please see specific information pulled into the AVS if appropriate.     Maria Antonia Michelle MD  [facility]  Phone: [phone]  Fax: [phone]

## 2021-01-15 NOTE — ASSESSMENT & PLAN NOTE
UNCONTROLLED  - pt currently on short acting adderall once daily that she takes only on work days, but notes that the effects of the medication wear off early in the day and she often works very late  - will transition from IR to XR formulation, maintain dose at 10 mg, Rx sent today  - Reviewed controlled nature of substance, potential for abuse/addiction, and possible adverse effects of medication. No red flags for abuse at this time.   - Controlled substances contract signed and in chart.   - Annual UDS screening done and appropriately neg 2/2020, will repeat at next in person encounter.   - Johnathon checked and appropriate.

## 2021-01-15 NOTE — TELEPHONE ENCOUNTER
Left message to register patient and asking if she can switch to a video visit. Asked that she call the office back.

## 2021-03-03 DIAGNOSIS — F98.8 ATTENTION DEFICIT DISORDER (ADD) IN ADULT: Chronic | ICD-10-CM

## 2021-03-03 NOTE — TELEPHONE ENCOUNTER
Caller: Vianey Bonilla    Relationship: Self    Best call back number: [phone]    Medication needed:   Requested Prescriptions     Pending Prescriptions Disp Refills   • amphetamine-dextroamphetamine XR (Adderall XR) 10 MG 24 hr capsule 30 capsule 0     Sig: Take 1 capsule by mouth Every Morning       What is the patient's preferred pharmacy: [facility] [address] [facility] - [phone] [facility] [phone]

## 2021-03-04 RX ORDER — DEXTROAMPHETAMINE SACCHARATE, AMPHETAMINE ASPARTATE MONOHYDRATE, DEXTROAMPHETAMINE SULFATE AND AMPHETAMINE SULFATE 2.5; 2.5; 2.5; 2.5 MG/1; MG/1; MG/1; MG/1
10 CAPSULE, EXTENDED RELEASE ORAL EVERY MORNING
Qty: 30 CAPSULE | Refills: 0 | Status: SHIPPED | OUTPATIENT
Start: 2021-03-04 | End: 2021-04-27 | Stop reason: DRUGHIGH

## 2021-04-27 ENCOUNTER — APPOINTMENT (OUTPATIENT)
Dept: WOMENS IMAGING | Facility: HOSPITAL | Age: 41
End: 2021-04-27

## 2021-04-27 ENCOUNTER — OFFICE VISIT (OUTPATIENT)
Dept: INTERNAL MEDICINE | Facility: CLINIC | Age: 41
End: 2021-04-27

## 2021-04-27 VITALS
BODY MASS INDEX: 23.92 KG/M2 | WEIGHT: 135 LBS | HEIGHT: 63 IN | DIASTOLIC BLOOD PRESSURE: 72 MMHG | OXYGEN SATURATION: 99 % | HEART RATE: 78 BPM | SYSTOLIC BLOOD PRESSURE: 120 MMHG | RESPIRATION RATE: 16 BRPM | TEMPERATURE: 95.3 F

## 2021-04-27 DIAGNOSIS — F41.9 ANXIETY AND DEPRESSION: Chronic | ICD-10-CM

## 2021-04-27 DIAGNOSIS — F98.8 ATTENTION DEFICIT DISORDER (ADD) IN ADULT: Primary | Chronic | ICD-10-CM

## 2021-04-27 DIAGNOSIS — F32.A ANXIETY AND DEPRESSION: Chronic | ICD-10-CM

## 2021-04-27 PROCEDURE — 99214 OFFICE O/P EST MOD 30 MIN: CPT | Performed by: INTERNAL MEDICINE

## 2021-04-27 PROCEDURE — 77063 BREAST TOMOSYNTHESIS BI: CPT | Performed by: RADIOLOGY

## 2021-04-27 PROCEDURE — 77067 SCR MAMMO BI INCL CAD: CPT | Performed by: RADIOLOGY

## 2021-04-27 RX ORDER — DEXTROAMPHETAMINE SACCHARATE, AMPHETAMINE ASPARTATE, DEXTROAMPHETAMINE SULFATE AND AMPHETAMINE SULFATE 2.5; 2.5; 2.5; 2.5 MG/1; MG/1; MG/1; MG/1
10 TABLET ORAL 2 TIMES DAILY
Qty: 60 TABLET | Refills: 0 | Status: SHIPPED | OUTPATIENT
Start: 2021-04-27 | End: 2021-06-01 | Stop reason: SDUPTHER

## 2021-04-27 RX ORDER — HYDROXYZINE HYDROCHLORIDE 10 MG/1
10 TABLET, FILM COATED ORAL 3 TIMES DAILY PRN
Qty: 30 TABLET | Refills: 2 | Status: SHIPPED | OUTPATIENT
Start: 2021-04-27 | End: 2021-10-27 | Stop reason: SDUPTHER

## 2021-04-27 RX ORDER — BUPROPION HYDROCHLORIDE 300 MG/1
300 TABLET ORAL DAILY
Qty: 90 TABLET | Refills: 2 | Status: SHIPPED | OUTPATIENT
Start: 2021-04-27 | End: 2021-07-27 | Stop reason: SDUPTHER

## 2021-04-27 NOTE — PROGRESS NOTES
"Chief Complaint  Med Refill, ADD, Anxiety, and Depression    Subjective          Vianey Bonilla presents to [facility] for med refill and follow up. Pt taking all medications daily as prescribed with good reported compliance. No issues or side effects with meds. Tried to change from IR to XR formulation of stimulant but notes it didn't seem like a good fit for her and had more side effects.     Objective   Vital Signs:     /72   Pulse 78   Temp 95.3 °F (35.2 °C)   Resp 16   Ht 160 cm (63\")   Wt 61.2 kg (135 lb)   SpO2 99%   BMI 23.91 kg/m²     Physical Exam  Vitals and nursing note reviewed.   Constitutional:       General: She is not in acute distress.     Appearance: Normal appearance.   Cardiovascular:      Rate and Rhythm: Normal rate and regular rhythm.      Pulses: Normal pulses.      Heart sounds: Normal heart sounds. No murmur heard.     Pulmonary:      Effort: Pulmonary effort is normal. No respiratory distress.      Breath sounds: Normal breath sounds.   Abdominal:      General: Abdomen is flat. Bowel sounds are normal.      Palpations: Abdomen is soft.      Tenderness: There is no abdominal tenderness.   Musculoskeletal:      Right lower leg: No edema.      Left lower leg: No edema.   Neurological:      Mental Status: She is alert and oriented to person, place, and time. Mental status is at baseline.   Psychiatric:         Mood and Affect: Mood normal.         Behavior: Behavior normal.        Result Review : : None     Assessment and Plan      Diagnoses and all orders for this visit:    1. Attention deficit disorder (ADD) in adult (Primary)  Assessment & Plan:  CONTROLLED  - Will refill stimulant meds today at current dose.   - Reviewed side effects of medication including headaches, tremors, insomnia, appetite changes, palpitations, chest pain, irritability--> pt tolerating well without any issues.   - Reviewed controlled nature of " substance, potential for abuse/addiction, and possible adverse effects of medication. No red flags for abuse at this time.   - Controlled substances contract signed and in chart.   - Annual UDS screening collected today.   - Johnathon checked and appropriate.           Orders:  -     amphetamine-dextroamphetamine (ADDERALL) 10 MG tablet; Take 1 tablet by mouth 2 (Two) Times a Day.  Dispense: 60 tablet; Refill: 0  -     [id number] 9+Oxycodone+Crt-Unbund - Urine, Clean Catch    2. Anxiety and depression  Assessment & Plan:  CONTROLLED  - cont on wellbutrin at current dose--> refills sent today  - Reviewed potential side effects of medication including sexual performance changes, insomnia, fatigue, weight changes. Pt tolerating well without any issues.       Orders:  -     buPROPion XL (WELLBUTRIN XL) 300 MG 24 hr tablet; Take 1 tablet by mouth Daily.  Dispense: 90 tablet; Refill: 2  -     hydrOXYzine (ATARAX) 10 MG tablet; Take 1 tablet by mouth 3 (Three) Times a Day As Needed for Anxiety.  Dispense: 30 tablet; Refill: 2      Follow Up   Return in about 3 months (around 7/27/2021) for ADHD follow up.    Patient was given instructions and counseling regarding her condition or for health maintenance advice. Please see specific information pulled into the AVS if appropriate.     Maria Antonia Michelle MD  [facility] [place] [facility]  Phone: [phone]  Fax: [phone]

## 2021-04-27 NOTE — ASSESSMENT & PLAN NOTE
CONTROLLED  - cont on wellbutrin at current dose--> refills sent today  - Reviewed potential side effects of medication including sexual performance changes, insomnia, fatigue, weight changes. Pt tolerating well without any issues.

## 2021-04-27 NOTE — ASSESSMENT & PLAN NOTE
CONTROLLED  - Will refill stimulant meds today at current dose.   - Reviewed side effects of medication including headaches, tremors, insomnia, appetite changes, palpitations, chest pain, irritability--> pt tolerating well without any issues.   - Reviewed controlled nature of substance, potential for abuse/addiction, and possible adverse effects of medication. No red flags for abuse at this time.   - Controlled substances contract signed and in chart.   - Annual UDS screening collected today.   - Johnathon checked and appropriate.

## 2021-04-28 LAB
AMPHETAMINES UR QL SCN: NEGATIVE NG/ML
BARBITURATES UR QL SCN: NEGATIVE NG/ML
BENZODIAZ UR QL SCN: NEGATIVE NG/ML
BZE UR QL SCN: NEGATIVE NG/ML
CANNABINOIDS UR QL SCN: NEGATIVE NG/ML
CREAT UR-MCNC: 383.3 MG/DL (ref 20–300)
LABORATORY COMMENT REPORT: ABNORMAL
METHADONE UR QL SCN: NEGATIVE NG/ML
OPIATES UR QL SCN: NEGATIVE NG/ML
OXYCODONE+OXYMORPHONE UR QL SCN: NEGATIVE NG/ML
PCP UR QL: NEGATIVE NG/ML
PH UR: 5.5 [PH] (ref 4.5–8.9)
PROPOXYPH UR QL SCN: NEGATIVE NG/ML

## 2021-06-01 DIAGNOSIS — F98.8 ATTENTION DEFICIT DISORDER (ADD) IN ADULT: Chronic | ICD-10-CM

## 2021-06-01 RX ORDER — DEXTROAMPHETAMINE SACCHARATE, AMPHETAMINE ASPARTATE, DEXTROAMPHETAMINE SULFATE AND AMPHETAMINE SULFATE 2.5; 2.5; 2.5; 2.5 MG/1; MG/1; MG/1; MG/1
10 TABLET ORAL 2 TIMES DAILY
Qty: 60 TABLET | Refills: 0 | Status: SHIPPED | OUTPATIENT
Start: 2021-06-01 | End: 2021-07-20 | Stop reason: SDUPTHER

## 2021-06-22 ENCOUNTER — TELEPHONE (OUTPATIENT)
Dept: INTERNAL MEDICINE | Facility: CLINIC | Age: 41
End: 2021-06-22

## 2021-06-22 NOTE — TELEPHONE ENCOUNTER
PATIENT CALLED TO LET DR. BENJAMIN KNOW THAT SHE HAS SIGNED A FORM TO LET DR. LYNN MORTON', PSYCHOLOGIST ASK ANY AND ALL QUESTIONS IN RESPECT FOR MEDICATIONS THIS IS IN PART OF A DIVORCE FOR [facility] EVALUATION.   IF SHE NEEDS TO SIGN A FORM TO ALLOW THIS, PLEASE LET HER KNOW.     CALL BACK NUMBER  [phone]    SHE  HAS THE FORM AVAILABLE IF NEEDS TO BE SEEN.

## 2021-07-08 ENCOUNTER — TELEPHONE (OUTPATIENT)
Dept: INTERNAL MEDICINE | Facility: CLINIC | Age: 41
End: 2021-07-08

## 2021-07-08 NOTE — TELEPHONE ENCOUNTER
Hub to read:  Called patient to let her know that we do not have the COVID vaccine at our office.

## 2021-07-20 DIAGNOSIS — F98.8 ATTENTION DEFICIT DISORDER (ADD) IN ADULT: Chronic | ICD-10-CM

## 2021-07-20 RX ORDER — DEXTROAMPHETAMINE SACCHARATE, AMPHETAMINE ASPARTATE, DEXTROAMPHETAMINE SULFATE AND AMPHETAMINE SULFATE 2.5; 2.5; 2.5; 2.5 MG/1; MG/1; MG/1; MG/1
10 TABLET ORAL 2 TIMES DAILY
Qty: 60 TABLET | Refills: 0 | Status: SHIPPED | OUTPATIENT
Start: 2021-07-20 | End: 2021-08-31 | Stop reason: SDUPTHER

## 2021-07-27 ENCOUNTER — OFFICE VISIT (OUTPATIENT)
Dept: INTERNAL MEDICINE | Facility: CLINIC | Age: 41
End: 2021-07-27

## 2021-07-27 VITALS
HEART RATE: 88 BPM | RESPIRATION RATE: 16 BRPM | HEIGHT: 63 IN | DIASTOLIC BLOOD PRESSURE: 78 MMHG | TEMPERATURE: 96.6 F | BODY MASS INDEX: 23.39 KG/M2 | WEIGHT: 132 LBS | SYSTOLIC BLOOD PRESSURE: 124 MMHG | OXYGEN SATURATION: 98 %

## 2021-07-27 DIAGNOSIS — F41.9 ANXIETY AND DEPRESSION: Chronic | ICD-10-CM

## 2021-07-27 DIAGNOSIS — F98.8 ATTENTION DEFICIT DISORDER (ADD) IN ADULT: Primary | Chronic | ICD-10-CM

## 2021-07-27 DIAGNOSIS — F32.A ANXIETY AND DEPRESSION: Chronic | ICD-10-CM

## 2021-07-27 PROCEDURE — 99214 OFFICE O/P EST MOD 30 MIN: CPT | Performed by: INTERNAL MEDICINE

## 2021-07-27 RX ORDER — BUPROPION HYDROCHLORIDE 300 MG/1
300 TABLET ORAL DAILY
Qty: 90 TABLET | Refills: 2 | Status: SHIPPED | OUTPATIENT
Start: 2021-07-27 | End: 2021-10-27 | Stop reason: SDUPTHER

## 2021-07-27 RX ORDER — ETONOGESTREL AND ETHINYL ESTRADIOL 11.7; 2.7 MG/1; MG/1
INSERT, EXTENDED RELEASE VAGINAL
COMMUNITY
Start: 2021-07-13 | End: 2021-08-20 | Stop reason: SDUPTHER

## 2021-07-27 NOTE — ASSESSMENT & PLAN NOTE
CONTROLLED  - Will cont stimulant meds today at current dose, no refills due today, recently refilled  - Reviewed side effects of medication including headaches, tremors, insomnia, appetite changes, palpitations, chest pain, irritability--> pt tolerating well without any issues.   - Reviewed controlled nature of substance, potential for abuse/addiction, and possible adverse effects of medication. No red flags for abuse at this time.   - Controlled substances contract signed and in chart.   - Annual UDS screening done 4/21 and positive only for amphetamines  - Johnathon checked and appropriate.

## 2021-07-27 NOTE — PROGRESS NOTES
"Chief Complaint  Follow-up, Med Refill, and ADD    Subjective          Vianey Bonilla presents to [facility] for 3 mos follow up and med refills. Pt still taking adderall most weekdays, typically will take BID on workdays. Rarely uses on weekends. No side effects on weekends, no insomnia. Does feel like medication is effective for her when she takes it and works adequately.   Taking wellbutrin for anxiety/depression and still feels like this is working adequately for her despite increases stress with court limon over kids that has drug on for several years now. Still working with her long term therapist as well as new court appointed therapist.   Pt overall seems to be coping surprisingly well with her current situation despite the number of life stressors she has been managing and the number of set backs she has encountered while trying to get her kids back. She has been working diligently with her therapist to keep a positive outlook with the end goal of re-obtaining custody of her children in the future.   She has been 100% compliant with all appts in this office for follow ups.     Objective   Vital Signs:     /78   Pulse 88   Temp 96.6 °F (35.9 °C)   Resp 16   Ht 160 cm (63\")   Wt 59.9 kg (132 lb)   SpO2 98%   BMI 23.38 kg/m²     Physical Exam  Vitals and nursing note reviewed.   Constitutional:       General: She is not in acute distress.     Appearance: Normal appearance.   Cardiovascular:      Rate and Rhythm: Normal rate and regular rhythm.      Pulses: Normal pulses.      Heart sounds: Normal heart sounds. No murmur heard.     Pulmonary:      Effort: Pulmonary effort is normal. No respiratory distress.      Breath sounds: Normal breath sounds.   Abdominal:      General: Abdomen is flat. Bowel sounds are normal.      Palpations: Abdomen is soft.      Tenderness: There is no abdominal tenderness.   Musculoskeletal:      Right lower leg: No " edema.      Left lower leg: No edema.   Neurological:      Mental Status: She is alert and oriented to person, place, and time. Mental status is at baseline.   Psychiatric:         Mood and Affect: Mood normal.         Behavior: Behavior normal.          Result Review :   The following data was reviewed by: Cony Michelle MD on 07/27/2021:  Labs:   [id number] 9+Oxycodone+Crt-Unbund - Urine, Clean Catch (04/27/2021 09:23)            Assessment and Plan      Diagnoses and all orders for this visit:    1. Attention deficit disorder (ADD) in adult (Primary)  Assessment & Plan:  CONTROLLED  - Will cont stimulant meds today at current dose, no refills due today, recently refilled  - Reviewed side effects of medication including headaches, tremors, insomnia, appetite changes, palpitations, chest pain, irritability--> pt tolerating well without any issues.   - Reviewed controlled nature of substance, potential for abuse/addiction, and possible adverse effects of medication. No red flags for abuse at this time.   - Controlled substances contract signed and in chart.   - Annual UDS screening done 4/21 and positive only for amphetamines  - Johnathon checked and appropriate.               2. Anxiety and depression  Assessment & Plan:  CONTROLLED  - cont on wellbutrin at current dose--> refills sent today  - Reviewed potential side effects of medication including sexual performance changes, insomnia, fatigue, weight changes. Pt tolerating well without any issues.     Orders:  -     buPROPion XL (WELLBUTRIN XL) 300 MG 24 hr tablet; Take 1 tablet by mouth Daily.  Dispense: 90 tablet; Refill: 2      Follow Up   Return in about 3 months (around 10/27/2021) for follow up ADHD.    Patient was given instructions and counseling regarding her condition or for health maintenance advice. Please see specific information pulled into the AVS if appropriate.     Maria Antonia Michelle MD  [facility] [place] [facility]  Phone: [phone]  Fax: [phone]

## 2021-08-20 RX ORDER — ETONOGESTREL AND ETHINYL ESTRADIOL 11.7; 2.7 MG/1; MG/1
1 INSERT, EXTENDED RELEASE VAGINAL
Qty: 3 EACH | Refills: 3 | OUTPATIENT
Start: 2021-08-20 | End: 2021-10-05

## 2021-08-20 NOTE — TELEPHONE ENCOUNTER
Caller: Vianey Bonilla    Relationship: Self    Best call back number: [phone]     Medication needed:   Requested Prescriptions     Pending Prescriptions Disp Refills   • etonogestrel-ethinyl estradiol (NUVARING) 0.12-0.015 MG/24HR vaginal ring         When do you need the refill by: ASAP    Does the patient have less than a 3 day supply:  [x] Yes  [] No    What is the patient's preferred pharmacy: [facility] [id number] [place] [address] AT [facility] [phone] [facility] [phone]

## 2021-08-20 NOTE — TELEPHONE ENCOUNTER
Rx Refill Note  Requested Prescriptions     Pending Prescriptions Disp Refills   • etonogestrel-ethinyl estradiol (NUVARING) 0.12-0.015 MG/24HR vaginal ring        Last office visit with prescribing clinician: 7/27/2021      Next office visit with prescribing clinician: Visit date not found            Vanessa Garcia MA  08/20/21, 15:35 EDT

## 2021-08-31 DIAGNOSIS — F98.8 ATTENTION DEFICIT DISORDER (ADD) IN ADULT: Chronic | ICD-10-CM

## 2021-08-31 RX ORDER — DEXTROAMPHETAMINE SACCHARATE, AMPHETAMINE ASPARTATE, DEXTROAMPHETAMINE SULFATE AND AMPHETAMINE SULFATE 2.5; 2.5; 2.5; 2.5 MG/1; MG/1; MG/1; MG/1
10 TABLET ORAL 2 TIMES DAILY
Qty: 60 TABLET | Refills: 0 | Status: SHIPPED | OUTPATIENT
Start: 2021-08-31 | End: 2021-10-27 | Stop reason: SDUPTHER

## 2021-08-31 RX ORDER — ETONOGESTREL AND ETHINYL ESTRADIOL 11.7; 2.7 MG/1; MG/1
1 INSERT, EXTENDED RELEASE VAGINAL
Qty: 3 EACH | Refills: 3 | Status: CANCELLED | OUTPATIENT
Start: 2021-08-31

## 2021-08-31 NOTE — TELEPHONE ENCOUNTER
Caller: Vianey Bonilla    Relationship: Self    Best call back number: [phone]    Medication needed:   Requested Prescriptions     Pending Prescriptions Disp Refills   • etonogestrel-ethinyl estradiol (NUVARING) 0.12-0.015 MG/24HR vaginal ring 3 each 3     Sig: Insert 1 each into the vagina Every 28 (Twenty-Eight) Days.       When do you need the refill by: ASAP     What additional details did the patient provide when requesting the medication: PATIENT STATES THAT PHARMACY ADVISED HER THEY HAVE NOT RECEIVED THIS MEDICATION YET, NEEDS SENT ASAP     Does the patient have less than a 3 day supply:  [x] Yes  [] No    What is the patient's preferred pharmacy: [address] [address] [facility] AT [place] [facility] [facility] [phone] [facility] [phone]

## 2021-08-31 NOTE — TELEPHONE ENCOUNTER
Rx Refill Note  Requested Prescriptions     Pending Prescriptions Disp Refills   • amphetamine-dextroamphetamine (ADDERALL) 10 MG tablet 60 tablet 0     Sig: Take 1 tablet by mouth 2 (Two) Times a Day.      Last office visit with prescribing clinician: 7/27/2021      Next office visit with prescribing clinician: Visit date not found            Vanessa Garcia MA  08/31/21, 14:11 EDT

## 2021-08-31 NOTE — TELEPHONE ENCOUNTER
Caller: Vianey Bonilla    Relationship: Self    Best call back number: [phone]    Medication needed:   Requested Prescriptions     Pending Prescriptions Disp Refills   • amphetamine-dextroamphetamine (ADDERALL) 10 MG tablet 60 tablet 0     Sig: Take 1 tablet by mouth 2 (Two) Times a Day.       When do you need the refill by: ASAP     Does the patient have less than a 3 day supply:  [x] Yes  [] No    What is the patient's preferred pharmacy: [facility] [id number] [place] [address] & [facility] [phone] [facility] [phone]

## 2021-10-05 RX ORDER — ETONOGESTREL AND ETHINYL ESTRADIOL 11.7; 2.7 MG/1; MG/1
INSERT, EXTENDED RELEASE VAGINAL
Qty: 3 EACH | Refills: 3 | Status: SHIPPED | OUTPATIENT
Start: 2021-10-05 | End: 2022-01-27 | Stop reason: SDUPTHER

## 2021-10-27 ENCOUNTER — OFFICE VISIT (OUTPATIENT)
Dept: INTERNAL MEDICINE | Facility: CLINIC | Age: 41
End: 2021-10-27

## 2021-10-27 VITALS
HEIGHT: 63 IN | BODY MASS INDEX: 23.28 KG/M2 | WEIGHT: 131.4 LBS | RESPIRATION RATE: 18 BRPM | DIASTOLIC BLOOD PRESSURE: 72 MMHG | SYSTOLIC BLOOD PRESSURE: 118 MMHG | OXYGEN SATURATION: 98 % | HEART RATE: 76 BPM | TEMPERATURE: 98.4 F

## 2021-10-27 DIAGNOSIS — F32.A ANXIETY AND DEPRESSION: Chronic | ICD-10-CM

## 2021-10-27 DIAGNOSIS — F41.9 ANXIETY AND DEPRESSION: Chronic | ICD-10-CM

## 2021-10-27 DIAGNOSIS — Z51.81 THERAPEUTIC DRUG MONITORING: Primary | ICD-10-CM

## 2021-10-27 DIAGNOSIS — Z87.11 HISTORY OF GASTRIC ULCER: ICD-10-CM

## 2021-10-27 DIAGNOSIS — F98.8 ATTENTION DEFICIT DISORDER (ADD) IN ADULT: Chronic | ICD-10-CM

## 2021-10-27 PROCEDURE — 99214 OFFICE O/P EST MOD 30 MIN: CPT | Performed by: INTERNAL MEDICINE

## 2021-10-27 PROCEDURE — 90686 IIV4 VACC NO PRSV 0.5 ML IM: CPT | Performed by: INTERNAL MEDICINE

## 2021-10-27 PROCEDURE — 90471 IMMUNIZATION ADMIN: CPT | Performed by: INTERNAL MEDICINE

## 2021-10-27 RX ORDER — BUPROPION HYDROCHLORIDE 300 MG/1
300 TABLET ORAL DAILY
Qty: 90 TABLET | Refills: 2 | Status: SHIPPED | OUTPATIENT
Start: 2021-10-27 | End: 2022-01-27 | Stop reason: SDUPTHER

## 2021-10-27 RX ORDER — HYDROXYZINE HYDROCHLORIDE 10 MG/1
10 TABLET, FILM COATED ORAL 3 TIMES DAILY PRN
Qty: 30 TABLET | Refills: 2 | Status: SHIPPED | OUTPATIENT
Start: 2021-10-27

## 2021-10-27 RX ORDER — PANTOPRAZOLE SODIUM 40 MG/1
40 TABLET, DELAYED RELEASE ORAL DAILY
Qty: 90 TABLET | Refills: 1 | Status: SHIPPED | OUTPATIENT
Start: 2021-10-27 | End: 2022-01-27 | Stop reason: SDUPTHER

## 2021-10-27 RX ORDER — DEXTROAMPHETAMINE SACCHARATE, AMPHETAMINE ASPARTATE, DEXTROAMPHETAMINE SULFATE AND AMPHETAMINE SULFATE 2.5; 2.5; 2.5; 2.5 MG/1; MG/1; MG/1; MG/1
10 TABLET ORAL 2 TIMES DAILY
Qty: 60 TABLET | Refills: 0 | Status: SHIPPED | OUTPATIENT
Start: 2021-10-27 | End: 2021-12-16 | Stop reason: SDUPTHER

## 2021-10-27 NOTE — PROGRESS NOTES
"Chief Complaint  ADD (follow up ) and Med Refill    Subjective          Vianey Bonilla presents to [facility]  For medication follow up and refill. She is taking adderall 10 mg BID tablets. She denies any noticeable side effects - heart racing, palpitations, appetite suppression, difficulty sleeping. Typically takes medication only on work days. She has some trouble concentrating, but attributes this to stressors rather than medication.     Of note, she was seen at an urgent care last week with diarrhea and vomiting, diagnosed with viral GE. No longer taking prescribed bentyl or zofran. COVID test was negative.       Objective   Vital Signs:   /72   Pulse 76   Temp 98.4 °F (36.9 °C)   Resp 18   Ht 160 cm (63\")   Wt 59.6 kg (131 lb 6.4 oz)   SpO2 98%   BMI 23.28 kg/m²     Physical Exam  Constitutional:       Appearance: Normal appearance.   HENT:      Head: Normocephalic.   Eyes:      Conjunctiva/sclera: Conjunctivae normal.      Pupils: Pupils are equal, round, and reactive to light.   Cardiovascular:      Rate and Rhythm: Normal rate and regular rhythm.      Heart sounds: No murmur heard.      Pulmonary:      Effort: Pulmonary effort is normal.      Breath sounds: Normal breath sounds.   Skin:     General: Skin is warm and dry.   Neurological:      General: No focal deficit present.      Mental Status: She is alert and oriented to person, place, and time.   Psychiatric:         Mood and Affect: Mood normal.         Behavior: Behavior normal.         Thought Content: Thought content normal.                 Assessment and Plan    Diagnoses and all orders for this visit:    1. Therapeutic drug monitoring (Primary)  -     Urine Drug Screen - Urine, Clean Catch    2. Anxiety and depression  Assessment & Plan:  Symptoms are currently controlled on Wellbutrin  mg daily and hydroxyzine 10 mg prn.   - Continue current medications.   - Discussed possible " GI and sexual side effects.      Orders:  -     hydrOXYzine (ATARAX) 10 MG tablet; Take 1 tablet by mouth 3 (Three) Times a Day As Needed for Anxiety.  Dispense: 30 tablet; Refill: 2  -     buPROPion XL (WELLBUTRIN XL) 300 MG 24 hr tablet; Take 1 tablet by mouth Daily.  Dispense: 90 tablet; Refill: 2    3. Attention deficit disorder (ADD) in adult  Assessment & Plan:  Controlled on current treatment.   - Continue Adderall 10 mg BID  - Pt counseled on possible side effects (appetite suppression, chest pain, insomnia, palpitations). Currently asymptomatic with normal BP, HR, and weight.   - Discussed addictive and abuse potential of the drug. Pt expressed understanding and is using responsibly with no red flags.   - UDS once yearly.   - Krystian queried.     Orders:  -     amphetamine-dextroamphetamine (ADDERALL) 10 MG tablet; Take 1 tablet by mouth 2 (Two) Times a Day.  Dispense: 60 tablet; Refill: 0    4. History of gastric ulcer  Assessment & Plan:  Previous gastric ulcer with bleeding. Controlled symptoms on daily PPI therapy.   - Reflux precautions.   - PPI refilled.     Orders:  -     pantoprazole (PROTONIX) 40 MG EC tablet; Take 1 tablet by mouth Daily.  Dispense: 90 tablet; Refill: 1  - krystian ok, discussed risks and benefits of meds as above  - rtc to follow up 3 months      Follow Up   No follow-ups on file.  Patient was given instructions and counseling regarding her condition or for health maintenance advice. Please see specific information pulled into the AVS if appropriate.

## 2021-10-27 NOTE — ASSESSMENT & PLAN NOTE
Symptoms are currently controlled on Wellbutrin  mg daily and hydroxyzine 10 mg prn.   - Continue current medications.   - Discussed possible GI and sexual side effects.

## 2021-10-27 NOTE — ASSESSMENT & PLAN NOTE
Controlled on current treatment.   - Continue Adderall 10 mg BID  - Pt counseled on possible side effects (appetite suppression, chest pain, insomnia, palpitations). Currently asymptomatic with normal BP, HR, and weight.   - Discussed addictive and abuse potential of the drug. Pt expressed understanding and is using responsibly with no red flags.   - UDS once yearly.   - Johnathon queried.

## 2021-10-27 NOTE — ASSESSMENT & PLAN NOTE
Previous gastric ulcer with bleeding. Controlled symptoms on daily PPI therapy.   - Reflux precautions.   - PPI refilled.

## 2021-10-29 LAB
AMPHETAMINES UR QL SCN: NEGATIVE NG/ML
BARBITURATES UR QL SCN: NEGATIVE NG/ML
BENZODIAZ UR QL SCN: NEGATIVE NG/ML
BZE UR QL SCN: NEGATIVE NG/ML
CANNABINOIDS UR QL SCN: NEGATIVE NG/ML
CREAT UR-MCNC: 166.9 MG/DL (ref 20–300)
LABORATORY COMMENT REPORT: NORMAL
METHADONE UR QL SCN: NEGATIVE NG/ML
OPIATES UR QL SCN: NEGATIVE NG/ML
OXYCODONE+OXYMORPHONE UR QL SCN: NEGATIVE NG/ML
PCP UR QL: NEGATIVE NG/ML
PH UR: 6.1 [PH] (ref 4.5–8.9)
PROPOXYPH UR QL SCN: NEGATIVE NG/ML

## 2021-12-16 DIAGNOSIS — F98.8 ATTENTION DEFICIT DISORDER (ADD) IN ADULT: Chronic | ICD-10-CM

## 2021-12-16 RX ORDER — DEXTROAMPHETAMINE SACCHARATE, AMPHETAMINE ASPARTATE, DEXTROAMPHETAMINE SULFATE AND AMPHETAMINE SULFATE 2.5; 2.5; 2.5; 2.5 MG/1; MG/1; MG/1; MG/1
10 TABLET ORAL 2 TIMES DAILY
Qty: 60 TABLET | Refills: 0 | Status: SHIPPED | OUTPATIENT
Start: 2021-12-16 | End: 2022-01-27 | Stop reason: SDUPTHER

## 2021-12-16 NOTE — TELEPHONE ENCOUNTER
Rx Refill Note  Requested Prescriptions     Pending Prescriptions Disp Refills   • amphetamine-dextroamphetamine (ADDERALL) 10 MG tablet 60 tablet 0     Sig: Take 1 tablet by mouth 2 (Two) Times a Day.      Last office visit with prescribing clinician: 10/27/2021      Next office visit with prescribing clinician: Visit date not found            Franklin Garrison MA  12/16/21, 12:15 EST

## 2022-01-27 ENCOUNTER — OFFICE VISIT (OUTPATIENT)
Dept: INTERNAL MEDICINE | Facility: CLINIC | Age: 42
End: 2022-01-27

## 2022-01-27 VITALS
HEART RATE: 96 BPM | DIASTOLIC BLOOD PRESSURE: 88 MMHG | WEIGHT: 136 LBS | TEMPERATURE: 97.1 F | RESPIRATION RATE: 16 BRPM | SYSTOLIC BLOOD PRESSURE: 140 MMHG | OXYGEN SATURATION: 95 % | BODY MASS INDEX: 24.1 KG/M2 | HEIGHT: 63 IN

## 2022-01-27 DIAGNOSIS — Z87.11 HISTORY OF GASTRIC ULCER: ICD-10-CM

## 2022-01-27 DIAGNOSIS — F41.9 ANXIETY AND DEPRESSION: Chronic | ICD-10-CM

## 2022-01-27 DIAGNOSIS — G43.009 MIGRAINE WITHOUT AURA AND WITHOUT STATUS MIGRAINOSUS, NOT INTRACTABLE: Chronic | ICD-10-CM

## 2022-01-27 DIAGNOSIS — Z30.44 ENCOUNTER FOR SURVEILLANCE OF VAGINAL RING HORMONAL CONTRACEPTIVE DEVICE: Chronic | ICD-10-CM

## 2022-01-27 DIAGNOSIS — F32.A ANXIETY AND DEPRESSION: Chronic | ICD-10-CM

## 2022-01-27 DIAGNOSIS — F98.8 ATTENTION DEFICIT DISORDER (ADD) IN ADULT: Chronic | ICD-10-CM

## 2022-01-27 DIAGNOSIS — Z20.822 CLOSE EXPOSURE TO COVID-19 VIRUS: Primary | ICD-10-CM

## 2022-01-27 PROCEDURE — 99214 OFFICE O/P EST MOD 30 MIN: CPT | Performed by: INTERNAL MEDICINE

## 2022-01-27 RX ORDER — BUPROPION HYDROCHLORIDE 300 MG/1
300 TABLET ORAL DAILY
Qty: 90 TABLET | Refills: 1 | Status: SHIPPED | OUTPATIENT
Start: 2022-01-27 | End: 2022-09-22 | Stop reason: SDUPTHER

## 2022-01-27 RX ORDER — DEXTROAMPHETAMINE SACCHARATE, AMPHETAMINE ASPARTATE, DEXTROAMPHETAMINE SULFATE AND AMPHETAMINE SULFATE 2.5; 2.5; 2.5; 2.5 MG/1; MG/1; MG/1; MG/1
10 TABLET ORAL 2 TIMES DAILY
Qty: 60 TABLET | Refills: 0 | Status: SHIPPED | OUTPATIENT
Start: 2022-01-27 | End: 2022-04-11 | Stop reason: SDUPTHER

## 2022-01-27 RX ORDER — ETONOGESTREL AND ETHINYL ESTRADIOL 11.7; 2.7 MG/1; MG/1
INSERT, EXTENDED RELEASE VAGINAL
Qty: 3 EACH | Refills: 3 | Status: SHIPPED | OUTPATIENT
Start: 2022-01-27 | End: 2022-04-11 | Stop reason: SDUPTHER

## 2022-01-27 RX ORDER — PANTOPRAZOLE SODIUM 40 MG/1
40 TABLET, DELAYED RELEASE ORAL DAILY
Qty: 90 TABLET | Refills: 3 | Status: SHIPPED | OUTPATIENT
Start: 2022-01-27 | End: 2022-04-11 | Stop reason: SDUPTHER

## 2022-01-27 NOTE — ASSESSMENT & PLAN NOTE
CONTROLLED  - Will cont stimulant meds today at current dose--> refill sent today  - Reviewed side effects of medication including headaches, tremors, insomnia, appetite changes, palpitations, chest pain, irritability--> pt tolerating well without any issues.   - Reviewed controlled nature of substance, potential for abuse/addiction, and possible adverse effects of medication. No red flags for abuse at this time.   - Controlled substances contract signed and in chart.   - Annual UDS screening done 4/21 and positive only for amphetamines, repeated and negative 10/2021 but uses more prn that scheduled daily  - Johnathon checked and appropriate.

## 2022-01-27 NOTE — PROGRESS NOTES
"Chief Complaint  Follow-up, Med Refill, and ADHD    Subjective          Vianey Bonilla presents to [facility] for med refills and follow up. Pt taking all medications daily as prescribed with good reported compliance. No issues or side effects with meds.   Pt's BP elevated in office today but notes she was rushing to get here and has had difficult day with her  today, phone call on the way into the office with her friend and  regarding these matters which is likely impacting her number.      Objective   Vital Signs:     /88   Pulse 96   Temp 97.1 °F (36.2 °C)   Resp 16   Ht 160 cm (63\")   Wt 61.7 kg (136 lb)   SpO2 95%   BMI 24.09 kg/m²     Physical Exam  Vitals and nursing note reviewed.   Constitutional:       General: She is not in acute distress.     Appearance: Normal appearance.   Cardiovascular:      Rate and Rhythm: Normal rate and regular rhythm.      Pulses: Normal pulses.      Heart sounds: Normal heart sounds. No murmur heard.      Pulmonary:      Effort: Pulmonary effort is normal. No respiratory distress.      Breath sounds: Normal breath sounds.   Abdominal:      General: Abdomen is flat. Bowel sounds are normal.      Palpations: Abdomen is soft.      Tenderness: There is no abdominal tenderness.   Musculoskeletal:      Right lower leg: No edema.      Left lower leg: No edema.   Neurological:      Mental Status: She is alert and oriented to person, place, and time. Mental status is at baseline.   Psychiatric:         Mood and Affect: Mood normal.         Behavior: Behavior normal.          Result Review :   The following data was reviewed by: Cony Michelle MD on 01/27/2022:    Labs:  Urine Drug Screen - Urine, Clean Catch (10/27/2021 08:52)   [id number] 9+Oxycodone+Crt-Unbund - Urine, Clean Catch (04/27/2021 09:23)        Assessment and Plan      Diagnoses and all orders for this visit:    1. Close exposure to COVID-19 " virus (Primary)  -     COVID-19,[facility] ROUTINE, NP/OP SWAB IN TRANSPORT MEDIA OR ESWAB 72 HR TAT - Swab, Nasopharynx    2. Attention deficit disorder (ADD) in adult  Assessment & Plan:  CONTROLLED  - Will cont stimulant meds today at current dose--> refill sent today  - Reviewed side effects of medication including headaches, tremors, insomnia, appetite changes, palpitations, chest pain, irritability--> pt tolerating well without any issues.   - Reviewed controlled nature of substance, potential for abuse/addiction, and possible adverse effects of medication. No red flags for abuse at this time.   - Controlled substances contract signed and in chart.   - Annual UDS screening done 4/21 and positive only for amphetamines, repeated and negative 10/2021 but uses more prn that scheduled daily  - Johnathon checked and appropriate.             Orders:  -     amphetamine-dextroamphetamine (ADDERALL) 10 MG tablet; Take 1 tablet by mouth 2 (Two) Times a Day.  Dispense: 60 tablet; Refill: 0    3. Anxiety and depression  Assessment & Plan:  CONTROLLED  - cont on current medications with wellbutrin and hydroxyzine prn--> refills sent, rarely using hydroxyzine currently  - Reviewed potential side effects of medication including sexual performance changes, insomnia, fatigue, weight changes. Pt tolerating well without any issues.       Orders:  -     buPROPion XL (WELLBUTRIN XL) 300 MG 24 hr tablet; Take 1 tablet by mouth Daily.  Dispense: 90 tablet; Refill: 1    4. Migraine without aura and without status migrainosus, not intractable  Assessment & Plan:  CONTROLLED  - managing without medications at this time  - has imitrex for prn use, has not needed recently  - call if frequency or intensity increases for medication adjustment        5. Encounter for surveillance of vaginal ring hormonal contraceptive device  -     etonogestrel-ethinyl estradiol (NUVARING) 0.12-0.015 MG/24HR vaginal ring; Insert vaginally and leave in place for 3  consecutive weeks, then remove for 1 week.  Dispense: 3 each; Refill: 3    6. History of gastric ulcer  Assessment & Plan:  Previous gastric ulcer with bleeding. Controlled symptoms on daily PPI therapy.   - Reflux precautions.   - PPI refilled.     Orders:  -     pantoprazole (PROTONIX) 40 MG EC tablet; Take 1 tablet by mouth Daily.  Dispense: 90 tablet; Refill: 3    Follow Up {Instructions Charge Capture  Follow-up Communications :23}  Return in about 3 months (around 4/27/2022) for follow up.    Patient was given instructions and counseling regarding her condition or for health maintenance advice. Please see specific information pulled into the AVS if appropriate.     Maria Antonia Michelle MD  [facility] [place] [facility]  Phone: [phone]  Fax: [phone]

## 2022-01-27 NOTE — ASSESSMENT & PLAN NOTE
CONTROLLED  - cont on current medications with wellbutrin and hydroxyzine prn--> refills sent, rarely using hydroxyzine currently  - Reviewed potential side effects of medication including sexual performance changes, insomnia, fatigue, weight changes. Pt tolerating well without any issues.

## 2022-01-27 NOTE — ASSESSMENT & PLAN NOTE
CONTROLLED  - managing without medications at this time  - has imitrex for prn use, has not needed recently  - call if frequency or intensity increases for medication adjustment

## 2022-01-29 LAB
LABCORP SARS-COV-2, NAA 2 DAY TAT: NORMAL
SARS-COV-2 RNA RESP QL NAA+PROBE: NOT DETECTED

## 2022-04-11 DIAGNOSIS — F98.8 ATTENTION DEFICIT DISORDER (ADD) IN ADULT: Chronic | ICD-10-CM

## 2022-04-11 DIAGNOSIS — Z87.11 HISTORY OF GASTRIC ULCER: ICD-10-CM

## 2022-04-11 DIAGNOSIS — Z30.44 ENCOUNTER FOR SURVEILLANCE OF VAGINAL RING HORMONAL CONTRACEPTIVE DEVICE: Chronic | ICD-10-CM

## 2022-04-11 RX ORDER — PANTOPRAZOLE SODIUM 40 MG/1
40 TABLET, DELAYED RELEASE ORAL DAILY
Qty: 90 TABLET | Refills: 3 | Status: SHIPPED | OUTPATIENT
Start: 2022-04-11 | End: 2022-05-19 | Stop reason: SDUPTHER

## 2022-04-11 RX ORDER — ETONOGESTREL AND ETHINYL ESTRADIOL 11.7; 2.7 MG/1; MG/1
INSERT, EXTENDED RELEASE VAGINAL
Qty: 3 EACH | Refills: 3 | Status: SHIPPED | OUTPATIENT
Start: 2022-04-11 | End: 2022-04-28 | Stop reason: SDUPTHER

## 2022-04-11 RX ORDER — DEXTROAMPHETAMINE SACCHARATE, AMPHETAMINE ASPARTATE, DEXTROAMPHETAMINE SULFATE AND AMPHETAMINE SULFATE 2.5; 2.5; 2.5; 2.5 MG/1; MG/1; MG/1; MG/1
10 TABLET ORAL 2 TIMES DAILY
Qty: 60 TABLET | Refills: 0 | Status: SHIPPED | OUTPATIENT
Start: 2022-04-11 | End: 2022-04-28 | Stop reason: DRUGHIGH

## 2022-04-11 NOTE — TELEPHONE ENCOUNTER
Rx Refill Note  Requested Prescriptions     Pending Prescriptions Disp Refills   • pantoprazole (PROTONIX) 40 MG EC tablet 90 tablet 3     Sig: Take 1 tablet by mouth Daily.   • etonogestrel-ethinyl estradiol (NUVARING) 0.12-0.015 MG/24HR vaginal ring 3 each 3     Sig: Insert vaginally and leave in place for 3 consecutive weeks, then remove for 1 week.   • amphetamine-dextroamphetamine (ADDERALL) 10 MG tablet 60 tablet 0     Sig: Take 1 tablet by mouth 2 (Two) Times a Day.      Last office visit with prescribing clinician: 1/27/2022      Next office visit with prescribing clinician: 4/28/2022            Vanessa Garcia MA  04/11/22, 09:16 EDT

## 2022-04-28 ENCOUNTER — OFFICE VISIT (OUTPATIENT)
Dept: INTERNAL MEDICINE | Facility: CLINIC | Age: 42
End: 2022-04-28

## 2022-04-28 VITALS
RESPIRATION RATE: 18 BRPM | BODY MASS INDEX: 24.52 KG/M2 | HEIGHT: 63 IN | OXYGEN SATURATION: 98 % | HEART RATE: 74 BPM | DIASTOLIC BLOOD PRESSURE: 78 MMHG | TEMPERATURE: 98 F | WEIGHT: 138.4 LBS | SYSTOLIC BLOOD PRESSURE: 132 MMHG

## 2022-04-28 DIAGNOSIS — Z30.44 ENCOUNTER FOR SURVEILLANCE OF VAGINAL RING HORMONAL CONTRACEPTIVE DEVICE: Chronic | ICD-10-CM

## 2022-04-28 DIAGNOSIS — F98.8 ATTENTION DEFICIT DISORDER (ADD) IN ADULT: Primary | Chronic | ICD-10-CM

## 2022-04-28 DIAGNOSIS — Z20.822 ENCOUNTER FOR SCREENING LABORATORY TESTING FOR COVID-19 VIRUS IN ASYMPTOMATIC PATIENT: ICD-10-CM

## 2022-04-28 DIAGNOSIS — F41.9 ANXIETY AND DEPRESSION: Chronic | ICD-10-CM

## 2022-04-28 DIAGNOSIS — F32.A ANXIETY AND DEPRESSION: Chronic | ICD-10-CM

## 2022-04-28 LAB
EXPIRATION DATE: NORMAL
FLUAV AG UPPER RESP QL IA.RAPID: NOT DETECTED
FLUBV AG UPPER RESP QL IA.RAPID: NOT DETECTED
INTERNAL CONTROL: NORMAL
Lab: NORMAL
SARS-COV-2 RNA RESP QL NAA+PROBE: NOT DETECTED

## 2022-04-28 PROCEDURE — 99214 OFFICE O/P EST MOD 30 MIN: CPT | Performed by: INTERNAL MEDICINE

## 2022-04-28 PROCEDURE — 87428 SARSCOV & INF VIR A&B AG IA: CPT | Performed by: INTERNAL MEDICINE

## 2022-04-28 RX ORDER — DEXTROAMPHETAMINE SACCHARATE, AMPHETAMINE ASPARTATE, DEXTROAMPHETAMINE SULFATE AND AMPHETAMINE SULFATE 3.75; 3.75; 3.75; 3.75 MG/1; MG/1; MG/1; MG/1
15 TABLET ORAL 2 TIMES DAILY
Qty: 60 TABLET | Refills: 0
Start: 2022-04-28 | End: 2022-05-16 | Stop reason: SDUPTHER

## 2022-04-28 RX ORDER — ETONOGESTREL AND ETHINYL ESTRADIOL 11.7; 2.7 MG/1; MG/1
INSERT, EXTENDED RELEASE VAGINAL
Qty: 3 EACH | Refills: 3 | Status: SHIPPED | OUTPATIENT
Start: 2022-04-28 | End: 2022-05-19 | Stop reason: SDUPTHER

## 2022-04-28 NOTE — PROGRESS NOTES
"Chief Complaint  ADD (Follow up ), Med Refill, and Covid test Monitor     Subjective          Vianey Bonilla presents to [facility] for follow up and med refills. Has adderall for BID use, does feel like the dose could be increased for when she does use them but does not use daily. No side effects.   Doing well from an anxiety/depression standpoint, still taking bupropion and feels like this works adequately for her.   Needs COVID test today for monitoring for work. Asymptomatic. Works from home with limited outside exposures.     Objective   Vital Signs:     /78   Pulse 74   Temp 98 °F (36.7 °C)   Resp 18   Ht 160 cm (63\")   Wt 62.8 kg (138 lb 6.4 oz)   SpO2 98%   BMI 24.52 kg/m²     Physical Exam  Vitals and nursing note reviewed.   Constitutional:       General: She is not in acute distress.     Appearance: Normal appearance.   Cardiovascular:      Rate and Rhythm: Normal rate and regular rhythm.      Pulses: Normal pulses.      Heart sounds: Normal heart sounds. No murmur heard.  Pulmonary:      Effort: Pulmonary effort is normal. No respiratory distress.      Breath sounds: Normal breath sounds.   Abdominal:      General: Abdomen is flat. Bowel sounds are normal.      Palpations: Abdomen is soft.      Tenderness: There is no abdominal tenderness.   Musculoskeletal:      Right lower leg: No edema.      Left lower leg: No edema.   Neurological:      Mental Status: She is alert and oriented to person, place, and time. Mental status is at baseline.   Psychiatric:         Mood and Affect: Mood normal.         Behavior: Behavior normal.          Result Review :   The following data was reviewed by: Cony Michelle MD on 04/28/2022:  Labs:  [id number] 9+Oxycodone+Crt-Unbund - Urine, Clean Catch (04/27/2021 09:23)  Urine Drug Screen - Urine, Clean Catch (10/27/2021 08:52)    Covid-19 + Flu A&B AG, Veritor (04/28/2022 08:50)        Assessment and Plan  "     Diagnoses and all orders for this visit:    1. Attention deficit disorder (ADD) in adult (Primary)  Assessment & Plan:  STABLE  - Will increase stimulant meds to 15 mg dose for improved efficacy--> refill not needed today but will send hgher dose when next refill is required  - Reviewed side effects of medication including headaches, tremors, insomnia, appetite changes, palpitations, chest pain, irritability--> pt tolerating well without any issues.   - Reviewed controlled nature of substance, potential for abuse/addiction, and possible adverse effects of medication. No red flags for abuse at this time.   - Controlled substances contract signed and in chart.   - Annual UDS screening done 4/21 and positive only for amphetamines, repeated and negative 10/2021 but uses more prn that scheduled daily  - Johnathon checked and appropriate.     Orders:  -     amphetamine-dextroamphetamine (Adderall) 15 MG tablet; Take 1 tablet by mouth 2 (Two) Times a Day.  Dispense: 60 tablet; Refill: 0    2. Anxiety and depression  Assessment & Plan:  CONTROLLED  - cont on current medications with wellbutrin and hydroxyzine prn--> refills sent, rarely using hydroxyzine currently  - Reviewed potential side effects of medication including sexual performance changes, insomnia, fatigue, weight changes. Pt tolerating well without any issues.         3. Encounter for screening laboratory testing for COVID-19 virus in asymptomatic patient  -     Covid-19 + Flu A&B AG, Veritor    4. Encounter for surveillance of vaginal ring hormonal contraceptive device  -     etonogestrel-ethinyl estradiol (NUVARING) 0.12-0.015 MG/24HR vaginal ring; Insert vaginally and leave in place for 3 consecutive weeks, then remove for 1 week.  Dispense: 3 each; Refill: 3        Follow Up   Return in about 3 months (around 7/28/2022) for follow up ADHD.    Patient was given instructions and counseling regarding her condition or for health maintenance advice. Please see  specific information pulled into the AVS if appropriate.     Maria Antonia Michelle MD  [facility] [place] [facility] [place] [facility]  Phone: [phone]  Fax: [phone]

## 2022-04-28 NOTE — ASSESSMENT & PLAN NOTE
STABLE  - Will increase stimulant meds to 15 mg dose for improved efficacy--> refill not needed today but will send hgher dose when next refill is required  - Reviewed side effects of medication including headaches, tremors, insomnia, appetite changes, palpitations, chest pain, irritability--> pt tolerating well without any issues.   - Reviewed controlled nature of substance, potential for abuse/addiction, and possible adverse effects of medication. No red flags for abuse at this time.   - Controlled substances contract signed and in chart.   - Annual UDS screening done 4/21 and positive only for amphetamines, repeated and negative 10/2021 but uses more prn that scheduled daily  - Johnathon checked and appropriate.

## 2022-05-16 DIAGNOSIS — F98.8 ATTENTION DEFICIT DISORDER (ADD) IN ADULT: Chronic | ICD-10-CM

## 2022-05-16 RX ORDER — DEXTROAMPHETAMINE SACCHARATE, AMPHETAMINE ASPARTATE, DEXTROAMPHETAMINE SULFATE AND AMPHETAMINE SULFATE 3.75; 3.75; 3.75; 3.75 MG/1; MG/1; MG/1; MG/1
15 TABLET ORAL 2 TIMES DAILY
Qty: 60 TABLET | Refills: 0 | Status: SHIPPED | OUTPATIENT
Start: 2022-05-16 | End: 2022-07-15 | Stop reason: SDUPTHER

## 2022-05-16 NOTE — TELEPHONE ENCOUNTER
Rx Refill Note  Requested Prescriptions     Pending Prescriptions Disp Refills   • amphetamine-dextroamphetamine (Adderall) 15 MG tablet 60 tablet 0     Sig: Take 1 tablet by mouth 2 (Two) Times a Day.      Last office visit with prescribing clinician: 4/28/2022      Next office visit with prescribing clinician: 8/5/2022            Vanessa Garcia MA  05/16/22, 13:21 EDT

## 2022-05-19 DIAGNOSIS — Z30.44 ENCOUNTER FOR SURVEILLANCE OF VAGINAL RING HORMONAL CONTRACEPTIVE DEVICE: Chronic | ICD-10-CM

## 2022-05-19 DIAGNOSIS — Z87.11 HISTORY OF GASTRIC ULCER: ICD-10-CM

## 2022-05-19 RX ORDER — PANTOPRAZOLE SODIUM 40 MG/1
40 TABLET, DELAYED RELEASE ORAL DAILY
Qty: 90 TABLET | Refills: 3 | Status: SHIPPED | OUTPATIENT
Start: 2022-05-19 | End: 2023-02-07 | Stop reason: SDUPTHER

## 2022-05-19 RX ORDER — ETONOGESTREL AND ETHINYL ESTRADIOL 11.7; 2.7 MG/1; MG/1
INSERT, EXTENDED RELEASE VAGINAL
Qty: 3 EACH | Refills: 3 | Status: SHIPPED | OUTPATIENT
Start: 2022-05-19 | End: 2023-02-07 | Stop reason: SDUPTHER

## 2022-05-19 NOTE — TELEPHONE ENCOUNTER
Pt request 90 day supply    Rx Refill Note  Requested Prescriptions     Pending Prescriptions Disp Refills   • etonogestrel-ethinyl estradiol (NUVARING) 0.12-0.015 MG/24HR vaginal ring 3 each 3     Sig: Insert vaginally and leave in place for 3 consecutive weeks, then remove for 1 week.   • pantoprazole (PROTONIX) 40 MG EC tablet 90 tablet 3     Sig: Take 1 tablet by mouth Daily.      Last office visit with prescribing clinician: 4/28/2022      Next office visit with prescribing clinician: 8/5/2022       {TIP  Please add Last Relevant Lab Date if appropriate:23}     Vanessa Garcia MA  05/19/22, 09:42 EDT

## 2022-06-02 ENCOUNTER — TELEPHONE (OUTPATIENT)
Dept: INTERNAL MEDICINE | Facility: CLINIC | Age: 42
End: 2022-06-02

## 2022-06-02 NOTE — TELEPHONE ENCOUNTER
Caller: Vianey Bonilla    Relationship: Self    Best call back number: [phone]    What medication are you requesting: PAXLOVID    What are your current symptoms: COUGH, CHILLS, BODY ACHES, VOMITING, TESTED POSITIVE FOR COVID ON 6/2/2022, SYMPTOMS STARTED 5/31/2022    How long have you been experiencing symptoms:  2 DAYS    Have you had these symptoms before:    [] Yes  [] No    Have you been treated for these symptoms before:   [] Yes  [] No    If a prescription is needed, what is your preferred pharmacy and phone number: [facility] [address] [address] [facility] AT [facility] & [facility] [phone] [facility] [phone]      Additional notes: PATIENT STATES SHE WOULD LIKE TO REQUEST THE PAXLOVID MEDICATION

## 2022-06-03 ENCOUNTER — TELEMEDICINE (OUTPATIENT)
Dept: INTERNAL MEDICINE | Facility: CLINIC | Age: 42
End: 2022-06-03

## 2022-06-03 DIAGNOSIS — U07.1 COVID-19 VIRUS INFECTION: Primary | ICD-10-CM

## 2022-06-03 PROCEDURE — 99213 OFFICE O/P EST LOW 20 MIN: CPT | Performed by: INTERNAL MEDICINE

## 2022-06-03 NOTE — PROGRESS NOTES
Chief Complaint  COVID    You have chosen to receive care through a telehealth visit.  Do you consent to use a video/audio connection for your medical care today? Yes    Subjective          Vianey Bonilla presents to [facility] for COVID. Symptoms started 6/1 with acute onset of body aches, headache. Rapid test done at [facility] on 6/2  and was positive. Currently, coughing but not productive at this time. Has had post-tussive emesis. L ear pain. No fevers or chills. No SOB or chest pains.     Objective     Physical Exam  Vitals and nursing note reviewed.   Constitutional:       General: She is not in acute distress.     Appearance: Normal appearance.   Pulmonary:      Effort: Pulmonary effort is normal. No respiratory distress.   Neurological:      Mental Status: She is alert and oriented to person, place, and time. Mental status is at baseline.   Psychiatric:         Mood and Affect: Mood normal.         Thought Content: Thought content normal.         Judgment: Judgment normal.          Result Review : : None     Assessment and Plan      Diagnoses and all orders for this visit:    1. COVID-19 virus infection (Primary)    - symptoms started 6/1, positive test 6/2  - pt to remain isolated from family as much as possible, masking in home when contact is necessary  - plan to isolate for 5 days from onset of symptoms and 24 hours symptom free, return to work/school date tentatively set at 6/6, this should then be followed by 5 days of strict masking both in home and in any other context  - household members should all quarantine for 10 days from last exposure to known contact  - ok for symptom treatment with any OTC meds including tylenol, ibuprofen, decongestants, cough medicines, etc  - reviewed red flag symptoms and home oxygen/HR monitoring, when to call back for further instructions, when to proceed to ER for further evaluation  - RTC or call back if worsening or if  any concerns for complications or secondary infections      Follow Up   Return if symptoms worsen or fail to improve.    Patient was given instructions and counseling regarding her condition or for health maintenance advice. Please see specific information pulled into the AVS if appropriate.     Maria Antonia Michelle MD  [facility] [place] [facility]  Phone: [phone]  Fax: [phone]

## 2022-07-15 DIAGNOSIS — F98.8 ATTENTION DEFICIT DISORDER (ADD) IN ADULT: Chronic | ICD-10-CM

## 2022-07-15 RX ORDER — DEXTROAMPHETAMINE SACCHARATE, AMPHETAMINE ASPARTATE, DEXTROAMPHETAMINE SULFATE AND AMPHETAMINE SULFATE 3.75; 3.75; 3.75; 3.75 MG/1; MG/1; MG/1; MG/1
15 TABLET ORAL 2 TIMES DAILY
Qty: 60 TABLET | Refills: 0 | Status: SHIPPED | OUTPATIENT
Start: 2022-07-15 | End: 2022-09-22 | Stop reason: SDUPTHER

## 2022-07-15 RX ORDER — DEXTROAMPHETAMINE SACCHARATE, AMPHETAMINE ASPARTATE, DEXTROAMPHETAMINE SULFATE AND AMPHETAMINE SULFATE 3.75; 3.75; 3.75; 3.75 MG/1; MG/1; MG/1; MG/1
15 TABLET ORAL 2 TIMES DAILY
Qty: 60 TABLET | Refills: 0 | Status: SHIPPED | OUTPATIENT
Start: 2022-07-15 | End: 2022-07-15 | Stop reason: SDUPTHER

## 2022-07-15 NOTE — TELEPHONE ENCOUNTER
Rx Refill Note  Requested Prescriptions     Pending Prescriptions Disp Refills   • amphetamine-dextroamphetamine (Adderall) 15 MG tablet 60 tablet 0     Sig: Take 1 tablet by mouth 2 (Two) Times a Day.      Last office visit with prescribing clinician: 4/28/2022      Next office visit with prescribing clinician: 8/5/2022            Vanessa Garcia MA  07/15/22, 15:59 EDT

## 2022-09-22 ENCOUNTER — OFFICE VISIT (OUTPATIENT)
Dept: INTERNAL MEDICINE | Facility: CLINIC | Age: 42
End: 2022-09-22

## 2022-09-22 VITALS
HEIGHT: 63 IN | OXYGEN SATURATION: 97 % | SYSTOLIC BLOOD PRESSURE: 120 MMHG | HEART RATE: 96 BPM | WEIGHT: 144 LBS | DIASTOLIC BLOOD PRESSURE: 78 MMHG | RESPIRATION RATE: 16 BRPM | BODY MASS INDEX: 25.52 KG/M2 | TEMPERATURE: 98 F

## 2022-09-22 DIAGNOSIS — F98.8 ATTENTION DEFICIT DISORDER (ADD) IN ADULT: Primary | Chronic | ICD-10-CM

## 2022-09-22 DIAGNOSIS — F32.A ANXIETY AND DEPRESSION: Chronic | ICD-10-CM

## 2022-09-22 DIAGNOSIS — Z87.11 HISTORY OF GASTRIC ULCER: ICD-10-CM

## 2022-09-22 DIAGNOSIS — Z23 ENCOUNTER FOR IMMUNIZATION: ICD-10-CM

## 2022-09-22 DIAGNOSIS — F41.9 ANXIETY AND DEPRESSION: Chronic | ICD-10-CM

## 2022-09-22 PROCEDURE — 90686 IIV4 VACC NO PRSV 0.5 ML IM: CPT | Performed by: INTERNAL MEDICINE

## 2022-09-22 PROCEDURE — 99214 OFFICE O/P EST MOD 30 MIN: CPT | Performed by: INTERNAL MEDICINE

## 2022-09-22 PROCEDURE — 90471 IMMUNIZATION ADMIN: CPT | Performed by: INTERNAL MEDICINE

## 2022-09-22 RX ORDER — DEXTROAMPHETAMINE SACCHARATE, AMPHETAMINE ASPARTATE, DEXTROAMPHETAMINE SULFATE AND AMPHETAMINE SULFATE 3.75; 3.75; 3.75; 3.75 MG/1; MG/1; MG/1; MG/1
15 TABLET ORAL 2 TIMES DAILY
Qty: 60 TABLET | Refills: 0 | Status: SHIPPED | OUTPATIENT
Start: 2022-09-22 | End: 2022-11-03 | Stop reason: SDUPTHER

## 2022-09-22 RX ORDER — BUPROPION HYDROCHLORIDE 300 MG/1
300 TABLET ORAL DAILY
Qty: 90 TABLET | Refills: 1 | Status: SHIPPED | OUTPATIENT
Start: 2022-09-22 | End: 2022-11-07 | Stop reason: SDUPTHER

## 2022-09-22 NOTE — ASSESSMENT & PLAN NOTE
STABLE  - cont adderall at 15 mg BID--> refill sent  - Reviewed side effects of medication including headaches, tremors, insomnia, appetite changes, palpitations, chest pain, irritability--> pt tolerating well without any issues.   - Reviewed controlled nature of substance, potential for abuse/addiction, and possible adverse effects of medication. No red flags for abuse at this time.   - Controlled substances contract signed and in chart.   - Annual UDS screening done 10/21 and negative but does not take stimulant daily  - Johnathon checked and appropriate.

## 2022-09-22 NOTE — PROGRESS NOTES
"Chief Complaint  Follow-up, Med Refill, ADHD, Anxiety, and Depression    Subjective          Vianey Bonilla presents to [facility] for follow up and med refills. Pt taking all medications daily as prescribed with good reported compliance. No issues or side effects with meds.   Feels like mental health wise she is doing well, no issues with her meds and does not feel anything needs adjustment.     Objective   Vital Signs:     /78   Pulse 96   Temp 98 °F (36.7 °C)   Resp 16   Ht 160 cm (63\")   Wt 65.3 kg (144 lb)   SpO2 97%   BMI 25.51 kg/m²     Physical Exam  Vitals and nursing note reviewed.   Constitutional:       General: She is not in acute distress.     Appearance: Normal appearance.   Cardiovascular:      Rate and Rhythm: Normal rate and regular rhythm.      Pulses: Normal pulses.      Heart sounds: Normal heart sounds. No murmur heard.  Pulmonary:      Effort: Pulmonary effort is normal. No respiratory distress.      Breath sounds: Normal breath sounds.   Abdominal:      General: Abdomen is flat. Bowel sounds are normal.      Palpations: Abdomen is soft.      Tenderness: There is no abdominal tenderness.   Musculoskeletal:      Right lower leg: No edema.      Left lower leg: No edema.   Neurological:      Mental Status: She is alert and oriented to person, place, and time. Mental status is at baseline.   Psychiatric:         Mood and Affect: Mood normal.         Behavior: Behavior normal.          Result Review :   The following data was reviewed by: Cony Michelle MD on 09/22/2022:  Labs:  Urine Drug Screen - Urine, Clean Catch (10/27/2021 08:52)        Assessment and Plan      Diagnoses and all orders for this visit:    1. Attention deficit disorder (ADD) in adult (Primary)  Assessment & Plan:  STABLE  - cont adderall at 15 mg BID--> refill sent  - Reviewed side effects of medication including headaches, tremors, insomnia, appetite " changes, palpitations, chest pain, irritability--> pt tolerating well without any issues.   - Reviewed controlled nature of substance, potential for abuse/addiction, and possible adverse effects of medication. No red flags for abuse at this time.   - Controlled substances contract signed and in chart.   - Annual UDS screening done 10/21 and negative but does not take stimulant daily  - Johnathon checked and appropriate.     Orders:  -     amphetamine-dextroamphetamine (Adderall) 15 MG tablet; Take 1 tablet by mouth 2 (Two) Times a Day.  Dispense: 60 tablet; Refill: 0    2. Anxiety and depression  Assessment & Plan:  CONTROLLED  - cont on current medications with wellbutrin and hydroxyzine prn--> refills sent, rarely using hydroxyzine currently  - Reviewed potential side effects of medication including sexual performance changes, insomnia, fatigue, weight changes. Pt tolerating well without any issues.       Orders:  -     buPROPion XL (WELLBUTRIN XL) 300 MG 24 hr tablet; Take 1 tablet by mouth Daily.  Dispense: 90 tablet; Refill: 1    3. History of gastric ulcer  Assessment & Plan:  STABLE  - Previous gastric ulcer with bleeding. Controlled symptoms on daily PPI therapy.  - reflux precautions reviewed and cont to encourage dietary trigger avoidance        Other orders  -     Flublok 18+yrs ([id number])      Follow Up   Return in about 3 months (around 12/22/2022) for follow up.    Patient was given instructions and counseling regarding her condition or for health maintenance advice. Please see specific information pulled into the AVS if appropriate.     Maria Antonia Michelle MD  [facility] [place] [facility] [place] [facility]  Phone: [phone]  Fax: [phone]

## 2022-09-22 NOTE — ASSESSMENT & PLAN NOTE
STABLE  - Previous gastric ulcer with bleeding. Controlled symptoms on daily PPI therapy.  - reflux precautions reviewed and cont to encourage dietary trigger avoidance

## 2022-11-03 DIAGNOSIS — F98.8 ATTENTION DEFICIT DISORDER (ADD) IN ADULT: Chronic | ICD-10-CM

## 2022-11-03 RX ORDER — DEXTROAMPHETAMINE SACCHARATE, AMPHETAMINE ASPARTATE, DEXTROAMPHETAMINE SULFATE AND AMPHETAMINE SULFATE 3.75; 3.75; 3.75; 3.75 MG/1; MG/1; MG/1; MG/1
15 TABLET ORAL 2 TIMES DAILY
Qty: 60 TABLET | Refills: 0 | Status: SHIPPED | OUTPATIENT
Start: 2022-11-03 | End: 2023-02-07 | Stop reason: SDUPTHER

## 2022-11-03 NOTE — TELEPHONE ENCOUNTER
Rx Refill Note  Requested Prescriptions     Pending Prescriptions Disp Refills   • amphetamine-dextroamphetamine (Adderall) 15 MG tablet 60 tablet 0     Sig: Take 1 tablet by mouth 2 (Two) Times a Day.      Last office visit with prescribing clinician: 9/22/2022      Next office visit with prescribing clinician: 12/22/2022            Vanessa Garcia MA  11/03/22, 09:53 EDT

## 2022-11-07 DIAGNOSIS — F41.9 ANXIETY AND DEPRESSION: Chronic | ICD-10-CM

## 2022-11-07 DIAGNOSIS — F32.A ANXIETY AND DEPRESSION: Chronic | ICD-10-CM

## 2022-11-07 RX ORDER — BUPROPION HYDROCHLORIDE 300 MG/1
300 TABLET ORAL DAILY
Qty: 90 TABLET | Refills: 1 | Status: SHIPPED | OUTPATIENT
Start: 2022-11-07 | End: 2022-11-17 | Stop reason: SDUPTHER

## 2022-11-07 NOTE — TELEPHONE ENCOUNTER
Rx Refill Note  Requested Prescriptions     Pending Prescriptions Disp Refills   • buPROPion XL (WELLBUTRIN XL) 300 MG 24 hr tablet 90 tablet 1     Sig: Take 1 tablet by mouth Daily.      Last office visit with prescribing clinician: 9/22/2022      Next office visit with prescribing clinician: 12/22/2022            Vanessa Garcia MA  11/07/22, 16:26 EST

## 2022-11-17 DIAGNOSIS — F32.A ANXIETY AND DEPRESSION: Chronic | ICD-10-CM

## 2022-11-17 DIAGNOSIS — F41.9 ANXIETY AND DEPRESSION: Chronic | ICD-10-CM

## 2022-11-17 RX ORDER — BUPROPION HYDROCHLORIDE 300 MG/1
300 TABLET ORAL DAILY
Qty: 90 TABLET | Refills: 1 | Status: SHIPPED | OUTPATIENT
Start: 2022-11-17 | End: 2023-02-07 | Stop reason: SDUPTHER

## 2022-11-17 NOTE — TELEPHONE ENCOUNTER
Caller: [facility] - [place], [place] - [address] [facility] - [phone] [facility] [phone] FX    Relationship: Pharmacy    Best call back number: [phone]    Requested Prescriptions:   Requested Prescriptions     Pending Prescriptions Disp Refills   • buPROPion XL (WELLBUTRIN XL) 300 MG 24 hr tablet 90 tablet 1     Sig: Take 1 tablet by mouth Daily.        Pharmacy where request should be sent: [facility] - [place], [place] - [address] [facility] - [phone] [facility] [phone] FX     Additional details provided by patient: PHARMACY STATES THE PATIENT IS REQUESTING A 90 DAY SUPPLY WITH 3 REFILLS ON THIS MEDICATION.     PHARMACY STATES THEY PREFER TO RECEIVE THE PRESCRIPTION THROUGH [facility] .     [facility] ADDRESS : [address] [place] [id number]    Miranda Ospina IkeKristofer Rep   11/17/22 13:01 EST

## 2022-11-17 NOTE — TELEPHONE ENCOUNTER
PHARMACY STATES THE PATIENT IS REQUESTING A 90 DAY SUPPLY WITH 3 REFILLS ON THIS MEDICATION.     Rx Refill Note  Requested Prescriptions     Pending Prescriptions Disp Refills   • buPROPion XL (WELLBUTRIN XL) 300 MG 24 hr tablet 90 tablet 1     Sig: Take 1 tablet by mouth Daily.      Last office visit with prescribing clinician: 9/22/2022      Next office visit with prescribing clinician: 12/22/2022            Vanessa Garcia MA  11/17/22, 13:15 EST

## 2023-02-07 ENCOUNTER — OFFICE VISIT (OUTPATIENT)
Dept: INTERNAL MEDICINE | Facility: CLINIC | Age: 43
End: 2023-02-07
Payer: COMMERCIAL

## 2023-02-07 VITALS
HEART RATE: 98 BPM | TEMPERATURE: 97.8 F | RESPIRATION RATE: 16 BRPM | HEIGHT: 63 IN | OXYGEN SATURATION: 99 % | DIASTOLIC BLOOD PRESSURE: 86 MMHG | WEIGHT: 152 LBS | SYSTOLIC BLOOD PRESSURE: 134 MMHG | BODY MASS INDEX: 26.93 KG/M2

## 2023-02-07 DIAGNOSIS — F41.9 ANXIETY AND DEPRESSION: Chronic | ICD-10-CM

## 2023-02-07 DIAGNOSIS — F32.A ANXIETY AND DEPRESSION: Chronic | ICD-10-CM

## 2023-02-07 DIAGNOSIS — Z87.11 HISTORY OF GASTRIC ULCER: ICD-10-CM

## 2023-02-07 DIAGNOSIS — Z30.44 ENCOUNTER FOR SURVEILLANCE OF VAGINAL RING HORMONAL CONTRACEPTIVE DEVICE: Chronic | ICD-10-CM

## 2023-02-07 DIAGNOSIS — F98.8 ATTENTION DEFICIT DISORDER (ADD) IN ADULT: Primary | Chronic | ICD-10-CM

## 2023-02-07 PROCEDURE — 99214 OFFICE O/P EST MOD 30 MIN: CPT | Performed by: INTERNAL MEDICINE

## 2023-02-07 RX ORDER — DEXTROAMPHETAMINE SACCHARATE, AMPHETAMINE ASPARTATE, DEXTROAMPHETAMINE SULFATE AND AMPHETAMINE SULFATE 3.75; 3.75; 3.75; 3.75 MG/1; MG/1; MG/1; MG/1
15 TABLET ORAL 2 TIMES DAILY
Qty: 60 TABLET | Refills: 0 | Status: SHIPPED | OUTPATIENT
Start: 2023-02-07 | End: 2023-02-07

## 2023-02-07 RX ORDER — PANTOPRAZOLE SODIUM 40 MG/1
40 TABLET, DELAYED RELEASE ORAL DAILY
Qty: 90 TABLET | Refills: 3 | Status: SHIPPED | OUTPATIENT
Start: 2023-02-07

## 2023-02-07 RX ORDER — DEXTROAMPHETAMINE SACCHARATE, AMPHETAMINE ASPARTATE, DEXTROAMPHETAMINE SULFATE AND AMPHETAMINE SULFATE 3.75; 3.75; 3.75; 3.75 MG/1; MG/1; MG/1; MG/1
15 TABLET ORAL 2 TIMES DAILY
Qty: 60 TABLET | Refills: 0 | Status: SHIPPED | OUTPATIENT
Start: 2023-02-07 | End: 2023-02-10 | Stop reason: SDUPTHER

## 2023-02-07 RX ORDER — PROPRANOLOL HYDROCHLORIDE 40 MG/1
40 TABLET ORAL 3 TIMES DAILY PRN
Qty: 60 TABLET | Refills: 1 | Status: SHIPPED | OUTPATIENT
Start: 2023-02-07 | End: 2023-02-07

## 2023-02-07 RX ORDER — ETONOGESTREL AND ETHINYL ESTRADIOL 11.7; 2.7 MG/1; MG/1
INSERT, EXTENDED RELEASE VAGINAL
Qty: 3 EACH | Refills: 3 | Status: SHIPPED | OUTPATIENT
Start: 2023-02-07

## 2023-02-07 RX ORDER — ETONOGESTREL AND ETHINYL ESTRADIOL 11.7; 2.7 MG/1; MG/1
INSERT, EXTENDED RELEASE VAGINAL
Qty: 3 EACH | Refills: 3 | Status: SHIPPED | OUTPATIENT
Start: 2023-02-07 | End: 2023-02-07

## 2023-02-07 RX ORDER — SERTRALINE HYDROCHLORIDE 25 MG/1
25 TABLET, FILM COATED ORAL DAILY
Qty: 30 TABLET | Refills: 1 | Status: SHIPPED | OUTPATIENT
Start: 2023-02-07

## 2023-02-07 RX ORDER — BUPROPION HYDROCHLORIDE 150 MG/1
150 TABLET ORAL DAILY
Qty: 30 TABLET | Refills: 1 | Status: SHIPPED | OUTPATIENT
Start: 2023-02-07 | End: 2023-02-07

## 2023-02-07 RX ORDER — PANTOPRAZOLE SODIUM 40 MG/1
40 TABLET, DELAYED RELEASE ORAL DAILY
Qty: 90 TABLET | Refills: 3 | Status: SHIPPED | OUTPATIENT
Start: 2023-02-07 | End: 2023-02-07

## 2023-02-07 RX ORDER — PROPRANOLOL HYDROCHLORIDE 40 MG/1
40 TABLET ORAL 3 TIMES DAILY PRN
Qty: 60 TABLET | Refills: 1 | Status: SHIPPED | OUTPATIENT
Start: 2023-02-07

## 2023-02-07 RX ORDER — BUPROPION HYDROCHLORIDE 150 MG/1
150 TABLET ORAL DAILY
Qty: 30 TABLET | Refills: 1 | Status: SHIPPED | OUTPATIENT
Start: 2023-02-07

## 2023-02-07 RX ORDER — SERTRALINE HYDROCHLORIDE 25 MG/1
25 TABLET, FILM COATED ORAL DAILY
Qty: 30 TABLET | Refills: 1 | Status: SHIPPED | OUTPATIENT
Start: 2023-02-07 | End: 2023-02-07

## 2023-02-07 NOTE — PROGRESS NOTES
"Chief Complaint  Follow-up, ADHD, Anxiety, and Depression    Subjective          Vianey Bonilla presents to [facility] for follow up and med refills. Pt taking all medications daily as prescribed with good reported compliance. No issues or side effects with meds.   Pt tried to take herself off of her adderall in Dec and Jan to see if she could reset and see how she did off these stimulant medications. She seems to be doing ok and having to consciously force her attention, her overall goal is that she could eventually go back to taking it but maybe at a lower dose with a similar efficacy as before. She was starting to notice some sleep changes and wasn't sure if this was related to the stimulant or not. She did restart her previous regimen last week.   She does feel like her anxiety is poorly controlled currently related to stress with her kids and upcoming court dates with potential big changes for the better on the horizon. Only other medication she was on was celexa which was not a good fit for her.     Objective   Vital Signs:     /86   Pulse 98   Temp 97.8 °F (36.6 °C)   Resp 16   Ht 160 cm (63\")   Wt 68.9 kg (152 lb)   SpO2 99%   BMI 26.93 kg/m²     Physical Exam  Vitals and nursing note reviewed.   Constitutional:       General: She is not in acute distress.     Appearance: Normal appearance.   Cardiovascular:      Rate and Rhythm: Normal rate and regular rhythm.      Pulses: Normal pulses.      Heart sounds: Normal heart sounds. No murmur heard.  Pulmonary:      Effort: Pulmonary effort is normal. No respiratory distress.      Breath sounds: Normal breath sounds.   Abdominal:      General: Abdomen is flat. Bowel sounds are normal.      Palpations: Abdomen is soft.      Tenderness: There is no abdominal tenderness.   Musculoskeletal:      Right lower leg: No edema.      Left lower leg: No edema.   Neurological:      Mental Status: She is alert " and oriented to person, place, and time. Mental status is at baseline.   Psychiatric:         Mood and Affect: Mood is anxious. Mood is not depressed. Affect is not tearful.         Speech: Speech normal.         Behavior: Behavior normal.         Thought Content: Thought content normal.          Result Review : : None     Assessment and Plan      Diagnoses and all orders for this visit:    1. Attention deficit disorder (ADD) in adult (Primary)  Assessment & Plan:  STABLE  - cont recently restarted adderall at 15 mg BID--> refill sent  - Reviewed side effects of medication including headaches, tremors, insomnia, appetite changes, palpitations, chest pain, irritability--> pt tolerating well without any issues.   - Reviewed controlled nature of substance, potential for abuse/addiction, and possible adverse effects of medication. No red flags for abuse at this time.   - Controlled substances contract signed and in chart.   - Annual UDS screening done 10/21 and negative but does not take stimulant daily, repeat due today  - Johnathon checked and appropriate.     Orders:  -     amphetamine-dextroamphetamine (Adderall) 15 MG tablet; Take 1 tablet by mouth 2 (Two) Times a Day.  Dispense: 60 tablet; Refill: 0  -     [id number] 9+Oxycodone+Crt-Unbund - Urine, Clean Catch    2. Anxiety and depression  Assessment & Plan:  UNCONTROLLED  - will change pt meds today--> decrease wellbutrin to 150 mg dose and augment with low dose SSRI with sertraline 25 mg  - Discussed potential side effects of this medication with patient including insomnia, changes in sexual performance, weight and appetite changes, fatigue, nausea, dizziness. Reviewed that medication will likely not be fully beneficial for 3-4 weeks, so encouraged pt to give  medication a full month before making decision to change or stop med.   - will send Rx for propranolol for prn use with social anxiety provoking situations or if panic ensues      Orders:  -     buPROPion XL  (WELLBUTRIN XL) 150 MG 24 hr tablet; Take 1 tablet by mouth Daily.  Dispense: 30 tablet; Refill: 1  -     propranolol (INDERAL) 40 MG tablet; Take 1 tablet by mouth 3 (Three) Times a Day As Needed (anxiety).  Dispense: 60 tablet; Refill: 1  -     sertraline (Zoloft) 25 MG tablet; Take 1 tablet by mouth Daily.  Dispense: 30 tablet; Refill: 1    3. Encounter for surveillance of vaginal ring hormonal contraceptive device  Assessment & Plan:  STABLE  - cont on monthly NuvaRing--> refills sent    Orders:  -     etonogestrel-ethinyl estradiol (NUVARING) 0.12-0.015 MG/24HR vaginal ring; Insert vaginally and leave in place for 3 consecutive weeks, then remove for 1 week.  Dispense: 3 each; Refill: 3    4. History of gastric ulcer  Assessment & Plan:  STABLE  - Previous gastric ulcer with bleeding. Controlled symptoms on daily PPI therapy.  - reflux precautions reviewed and cont to encourage dietary trigger avoidance      Orders:  -     pantoprazole (PROTONIX) 40 MG EC tablet; Take 1 tablet by mouth Daily.  Dispense: 90 tablet; Refill: 3        Follow Up   Return in about 2 months (around 4/7/2023) for follow up anxiety.    Patient was given instructions and counseling regarding her condition or for health maintenance advice. Please see specific information pulled into the AVS if appropriate.     Maria Antonia Michelle MD  [facility] [place] [facility]  Phone: [phone]  Fax: [phone]

## 2023-02-07 NOTE — ASSESSMENT & PLAN NOTE
STABLE  - cont recently restarted adderall at 15 mg BID--> refill sent  - Reviewed side effects of medication including headaches, tremors, insomnia, appetite changes, palpitations, chest pain, irritability--> pt tolerating well without any issues.   - Reviewed controlled nature of substance, potential for abuse/addiction, and possible adverse effects of medication. No red flags for abuse at this time.   - Controlled substances contract signed and in chart.   - Annual UDS screening done 10/21 and negative but does not take stimulant daily, repeat due today  - Johnathon checked and appropriate.

## 2023-02-10 DIAGNOSIS — F98.8 ATTENTION DEFICIT DISORDER (ADD) IN ADULT: Chronic | ICD-10-CM

## 2023-02-10 RX ORDER — DEXTROAMPHETAMINE SACCHARATE, AMPHETAMINE ASPARTATE, DEXTROAMPHETAMINE SULFATE AND AMPHETAMINE SULFATE 3.75; 3.75; 3.75; 3.75 MG/1; MG/1; MG/1; MG/1
15 TABLET ORAL 2 TIMES DAILY
Qty: 60 TABLET | Refills: 0 | Status: SHIPPED | OUTPATIENT
Start: 2023-02-10 | End: 2023-03-23 | Stop reason: SDUPTHER

## 2023-02-10 NOTE — TELEPHONE ENCOUNTER
Caller: Vianey Bonilla    Relationship: Self    Best call back number: [phone]     Requested Prescriptions:   Requested Prescriptions     Pending Prescriptions Disp Refills   • amphetamine-dextroamphetamine (Adderall) 15 MG tablet 60 tablet 0     Sig: Take 1 tablet by mouth 2 (Two) Times a Day.        Pharmacy where request should be sent: [facility] [address] [place] [address] [facility] - [phone]  - [phone] FX     Additional details provided by patient: THE REFILL ON 02/07/23 WENT TO A PHARMACY THAT DOES NOT HAVE THIS MEDICATION IN STOCK.    Does the patient have less than a 3 day supply:  [x] Yes  [] No    Would you like a call back once the refill request has been completed: [] Yes [x] No    If the office needs to give you a call back, can they leave a voicemail: [] Yes [x] No    Michelle Hall Rep   02/10/23 12:48 EST

## 2023-02-10 NOTE — TELEPHONE ENCOUNTER
Rx need to be sent to correct pharmacy. I have canceled previous sent to incorrect pharmacy    [id number] - [place], [place] - [address] - [phone]  - [phone] FX

## 2023-02-11 LAB
AMPHET UR CFM-MCNC: >3000 NG/ML
AMPHET UR QL CFM: POSITIVE
AMPHETAMINES UR QL SCN: NORMAL NG/ML
AMPHETAMINES UR QL: POSITIVE
BARBITURATES UR QL SCN: NEGATIVE NG/ML
BENZODIAZ UR QL SCN: NEGATIVE NG/ML
BZE UR QL SCN: NEGATIVE NG/ML
CANNABINOIDS UR QL SCN: NEGATIVE NG/ML
CREAT UR-MCNC: 165 MG/DL (ref 20–300)
LABORATORY COMMENT REPORT: NORMAL
METHADONE UR QL SCN: NEGATIVE NG/ML
METHAMPHET UR QL CFM: NEGATIVE
OPIATES UR QL SCN: NEGATIVE NG/ML
OXYCODONE+OXYMORPHONE UR QL SCN: NEGATIVE NG/ML
PCP UR QL: NEGATIVE NG/ML
PH UR: 6 [PH] (ref 4.5–8.9)
PROPOXYPH UR QL SCN: NEGATIVE NG/ML

## 2023-03-23 DIAGNOSIS — F98.8 ATTENTION DEFICIT DISORDER (ADD) IN ADULT: Chronic | ICD-10-CM

## 2023-03-23 RX ORDER — DEXTROAMPHETAMINE SACCHARATE, AMPHETAMINE ASPARTATE, DEXTROAMPHETAMINE SULFATE AND AMPHETAMINE SULFATE 3.75; 3.75; 3.75; 3.75 MG/1; MG/1; MG/1; MG/1
15 TABLET ORAL 2 TIMES DAILY
Qty: 60 TABLET | Refills: 0 | Status: SHIPPED | OUTPATIENT
Start: 2023-03-23

## 2023-03-23 NOTE — TELEPHONE ENCOUNTER
Rx Refill Note  Requested Prescriptions     Pending Prescriptions Disp Refills   • amphetamine-dextroamphetamine (Adderall) 15 MG tablet 60 tablet 0     Sig: Take 1 tablet by mouth 2 (Two) Times a Day.      Last office visit with prescribing clinician: 2/7/2023   Last telemedicine visit with prescribing clinician: 4/20/2023   Next office visit with prescribing clinician: 4/20/2023                         Would you like a call back once the refill request has been completed: [] Yes [] No    If the office needs to give you a call back, can they leave a voicemail: [] Yes [] No    Vanessa Garcia MA  03/23/23, 10:57 EDT

## 2023-05-02 ENCOUNTER — OFFICE VISIT (OUTPATIENT)
Dept: INTERNAL MEDICINE | Facility: CLINIC | Age: 43
End: 2023-05-02
Payer: COMMERCIAL

## 2023-05-02 VITALS
WEIGHT: 151 LBS | SYSTOLIC BLOOD PRESSURE: 130 MMHG | DIASTOLIC BLOOD PRESSURE: 80 MMHG | RESPIRATION RATE: 16 BRPM | HEART RATE: 107 BPM | TEMPERATURE: 96.6 F | BODY MASS INDEX: 26.75 KG/M2 | HEIGHT: 63 IN | OXYGEN SATURATION: 96 %

## 2023-05-02 DIAGNOSIS — F41.9 ANXIETY AND DEPRESSION: Chronic | ICD-10-CM

## 2023-05-02 DIAGNOSIS — F98.8 ATTENTION DEFICIT DISORDER (ADD) IN ADULT: Primary | Chronic | ICD-10-CM

## 2023-05-02 DIAGNOSIS — B00.2 RECURRENT ORAL HERPES SIMPLEX: Chronic | ICD-10-CM

## 2023-05-02 DIAGNOSIS — Z87.11 HISTORY OF GASTRIC ULCER: ICD-10-CM

## 2023-05-02 DIAGNOSIS — F32.A ANXIETY AND DEPRESSION: Chronic | ICD-10-CM

## 2023-05-02 RX ORDER — VALACYCLOVIR HYDROCHLORIDE 1 G/1
2000 TABLET, FILM COATED ORAL 2 TIMES DAILY
Qty: 4 TABLET | Refills: 4 | Status: SHIPPED | OUTPATIENT
Start: 2023-05-02

## 2023-05-02 RX ORDER — PANTOPRAZOLE SODIUM 40 MG/1
TABLET, DELAYED RELEASE ORAL
Qty: 90 TABLET | Refills: 3 | Status: SHIPPED | OUTPATIENT
Start: 2023-05-02

## 2023-05-02 RX ORDER — BUPROPION HYDROCHLORIDE 300 MG/1
300 TABLET ORAL DAILY
Qty: 90 TABLET | Refills: 1 | Status: SHIPPED | OUTPATIENT
Start: 2023-05-02

## 2023-05-02 RX ORDER — DEXTROAMPHETAMINE SACCHARATE, AMPHETAMINE ASPARTATE, DEXTROAMPHETAMINE SULFATE AND AMPHETAMINE SULFATE 3.75; 3.75; 3.75; 3.75 MG/1; MG/1; MG/1; MG/1
15 TABLET ORAL 2 TIMES DAILY
Qty: 60 TABLET | Refills: 0 | Status: SHIPPED | OUTPATIENT
Start: 2023-05-02

## 2023-05-02 NOTE — PROGRESS NOTES
"Chief Complaint  Follow-up and ADHD    Subjective          Vianey Bonilla presents to [facility] for follow up and medication refills.   Attempted to start sertraline at her last appt and notes she had a pretty strong reaction with extreme lethargy, headache after 2 weeks, has since weaned herself off and does feel more like herself. She changed back to the 300 mg dose of her wellbutrin when she went off the ssri. She was also given propranolol for prn use and feels like this works very well for her when she needs it without a sedating side effect.     Objective   Vital Signs:     /80   Pulse 107   Temp 96.6 °F (35.9 °C)   Resp 16   Ht 160 cm (63\")   Wt 68.5 kg (151 lb)   SpO2 96%   BMI 26.75 kg/m²     Physical Exam  Vitals and nursing note reviewed.   Constitutional:       General: She is not in acute distress.     Appearance: Normal appearance.   Cardiovascular:      Rate and Rhythm: Normal rate and regular rhythm.      Pulses: Normal pulses.      Heart sounds: Normal heart sounds. No murmur heard.  Pulmonary:      Effort: Pulmonary effort is normal. No respiratory distress.      Breath sounds: Normal breath sounds.   Abdominal:      General: Abdomen is flat. Bowel sounds are normal.      Palpations: Abdomen is soft.      Tenderness: There is no abdominal tenderness.   Musculoskeletal:      Right lower leg: No edema.      Left lower leg: No edema.   Neurological:      Mental Status: She is alert and oriented to person, place, and time. Mental status is at baseline.   Psychiatric:         Mood and Affect: Mood normal. Mood is not anxious or depressed. Affect is not tearful.         Speech: Speech normal.         Behavior: Behavior normal.         Thought Content: Thought content normal.         Cognition and Memory: Cognition normal.         Judgment: Judgment normal.        Result Review :   The following data was reviewed by: Cony Michelle MD " on 05/02/2023:  Labs:  Amphetamine Urine Confirmation - (02/07/2023 13:21)   [id number] 9+Oxycodone+Crt-Unbund - Urine, Clean Catch (02/07/2023 13:21)        Assessment and Plan      Diagnoses and all orders for this visit:    1. Attention deficit disorder (ADD) in adult (Primary)  Assessment & Plan:  STABLE  - cont recently restarted adderall at 15 mg BID--> refill sent  - Reviewed side effects of medication including headaches, tremors, insomnia, appetite changes, palpitations, chest pain, irritability--> pt tolerating well without any issues.   - Reviewed controlled nature of substance, potential for abuse/addiction, and possible adverse effects of medication. No red flags for abuse at this time.   - Controlled substances contract signed and in chart.   - Annual UDS screening done 02/23 and positive for amphetamines only as appropriate based on Rx  - Johnathon checked and appropriate.     Orders:  -     amphetamine-dextroamphetamine (Adderall) 15 MG tablet; Take 1 tablet by mouth 2 (Two) Times a Day.  Dispense: 60 tablet; Refill: 0    2. Anxiety and depression  Assessment & Plan:  STABLE  - will return to previous regimen of wellbutrin 300 mg daily--> refills sent  - hold on any further SSRI therapy at this time  - Reviewed potential side effects of medication including sexual performance changes, insomnia, fatigue, weight changes. Pt tolerating well without any issues.   - cont propranolol for prn use with onset of anxiety and panic      Orders:  -     buPROPion XL (WELLBUTRIN XL) 300 MG 24 hr tablet; Take 1 tablet by mouth Daily.  Dispense: 90 tablet; Refill: 1    3. Recurrent oral herpes simplex  -     valACYclovir (Valtrex) 1000 MG tablet; Take 2 tablets by mouth 2 (Two) Times a Day.  Dispense: 4 tablet; Refill: 4        Follow Up   Return in about 3 months (around 8/2/2023) for follow up.    Patient was given instructions and counseling regarding her condition or for health maintenance advice. Please see specific  information pulled into the AVS if appropriate.     Maria Antonia Michelle MD  [facility] [place] [facility]  Phone: [phone]  Fax: [phone]

## 2023-05-02 NOTE — ASSESSMENT & PLAN NOTE
STABLE  - cont recently restarted adderall at 15 mg BID--> refill sent  - Reviewed side effects of medication including headaches, tremors, insomnia, appetite changes, palpitations, chest pain, irritability--> pt tolerating well without any issues.   - Reviewed controlled nature of substance, potential for abuse/addiction, and possible adverse effects of medication. No red flags for abuse at this time.   - Controlled substances contract signed and in chart.   - Annual UDS screening done 02/23 and positive for amphetamines only as appropriate based on Rx  - Johnathon checked and appropriate.

## 2023-05-02 NOTE — TELEPHONE ENCOUNTER
Rx Refill Note  Requested Prescriptions     Pending Prescriptions Disp Refills   • pantoprazole (PROTONIX) 40 MG EC tablet [Pharmacy Med Name: PANTOPRAZOLE SODIUM DR TABS 40MG] 90 tablet 3     Sig: TAKE 1 TABLET DAILY      Last office visit with prescribing clinician: 5/2/2023   Last telemedicine visit with prescribing clinician: 8/3/2023   Next office visit with prescribing clinician: 8/3/2023                         Would you like a call back once the refill request has been completed: [] Yes [] No    If the office needs to give you a call back, can they leave a voicemail: [] Yes [] No    Vanessa Garcia MA  05/02/23, 10:49 EDT

## 2023-05-02 NOTE — ASSESSMENT & PLAN NOTE
STABLE  - will return to previous regimen of wellbutrin 300 mg daily--> refills sent  - hold on any further SSRI therapy at this time  - Reviewed potential side effects of medication including sexual performance changes, insomnia, fatigue, weight changes. Pt tolerating well without any issues.   - cont propranolol for prn use with onset of anxiety and panic

## 2023-05-15 DIAGNOSIS — Z30.44 ENCOUNTER FOR SURVEILLANCE OF VAGINAL RING HORMONAL CONTRACEPTIVE DEVICE: Chronic | ICD-10-CM

## 2023-05-15 RX ORDER — ETONOGESTREL AND ETHINYL ESTRADIOL 11.7; 2.7 MG/1; MG/1
INSERT, EXTENDED RELEASE VAGINAL
Qty: 3 EACH | Refills: 3 | Status: SHIPPED | OUTPATIENT
Start: 2023-05-15

## 2023-05-15 NOTE — TELEPHONE ENCOUNTER
Rx Refill Note  Requested Prescriptions     Pending Prescriptions Disp Refills   • etonogestrel-ethinyl estradiol (NUVARING) 0.12-0.015 MG/24HR vaginal ring [Pharmacy Med Name: ETONOGESTREL/ETH ES VAG RING] 3 each 3     Sig: INSERT VAGINALLY AND LEAVE IN PLACE FOR 3 CONSECUTIVE WEEKS, THEN REMOVE FOR 1 WEEK      Last office visit with prescribing clinician: 5/2/2023   Last telemedicine visit with prescribing clinician: 5/2/2023   Next office visit with prescribing clinician: 8/3/2023                         Would you like a call back once the refill request has been completed: [] Yes [] No    If the office needs to give you a call back, can they leave a voicemail: [] Yes [] No    Vanessa Garcia MA  05/15/23, 08:09 EDT

## 2023-06-08 DIAGNOSIS — F98.8 ATTENTION DEFICIT DISORDER (ADD) IN ADULT: Chronic | ICD-10-CM

## 2023-06-08 RX ORDER — DEXTROAMPHETAMINE SACCHARATE, AMPHETAMINE ASPARTATE, DEXTROAMPHETAMINE SULFATE AND AMPHETAMINE SULFATE 3.75; 3.75; 3.75; 3.75 MG/1; MG/1; MG/1; MG/1
15 TABLET ORAL 2 TIMES DAILY
Qty: 60 TABLET | Refills: 0 | Status: SHIPPED | OUTPATIENT
Start: 2023-06-08

## 2023-06-08 NOTE — TELEPHONE ENCOUNTER
Rx Refill Note  Requested Prescriptions     Pending Prescriptions Disp Refills    amphetamine-dextroamphetamine (Adderall) 15 MG tablet 60 tablet 0     Sig: Take 1 tablet by mouth 2 (Two) Times a Day.      Last office visit with prescribing clinician: 5/2/2023   Last telemedicine visit with prescribing clinician: Visit date not found   Next office visit with prescribing clinician: 8/3/2023                         Would you like a call back once the refill request has been completed: [] Yes [] No    If the office needs to give you a call back, can they leave a voicemail: [] Yes [] No    Vanessa Garcia MA  06/08/23, 13:48 EDT

## 2023-09-01 ENCOUNTER — OFFICE VISIT (OUTPATIENT)
Dept: INTERNAL MEDICINE | Facility: CLINIC | Age: 43
End: 2023-09-01
Payer: COMMERCIAL

## 2023-09-01 VITALS
DIASTOLIC BLOOD PRESSURE: 80 MMHG | SYSTOLIC BLOOD PRESSURE: 128 MMHG | HEART RATE: 102 BPM | RESPIRATION RATE: 16 BRPM | BODY MASS INDEX: 27.29 KG/M2 | OXYGEN SATURATION: 97 % | TEMPERATURE: 98 F | WEIGHT: 154 LBS | HEIGHT: 63 IN

## 2023-09-01 DIAGNOSIS — F32.A ANXIETY AND DEPRESSION: Chronic | ICD-10-CM

## 2023-09-01 DIAGNOSIS — F98.8 ATTENTION DEFICIT DISORDER (ADD) IN ADULT: Primary | Chronic | ICD-10-CM

## 2023-09-01 DIAGNOSIS — F41.9 ANXIETY AND DEPRESSION: Chronic | ICD-10-CM

## 2023-09-01 RX ORDER — DEXTROAMPHETAMINE SACCHARATE, AMPHETAMINE ASPARTATE, DEXTROAMPHETAMINE SULFATE AND AMPHETAMINE SULFATE 3.75; 3.75; 3.75; 3.75 MG/1; MG/1; MG/1; MG/1
15 TABLET ORAL 2 TIMES DAILY
Qty: 60 TABLET | Refills: 0 | Status: SHIPPED | OUTPATIENT
Start: 2023-09-01

## 2023-09-01 NOTE — PROGRESS NOTES
"Chief Complaint  Follow-up and ADHD    Subjective          Vianey Bonilla presents to [facility] for MED REFILLS AND FOLLOW UP. Pt taking all medications daily as prescribed with good reported compliance. No issues or side effects with meds.   Pt notes she had a bad yeast infection related to one of her rings, had to take the ring out early and now has had irregular cycles since that time.     Objective   Vital Signs:     /80   Pulse 102   Temp 98 øF (36.7 øC)   Resp 16   Ht 160 cm (63\")   Wt 69.9 kg (154 lb)   SpO2 97%   BMI 27.28 kg/mý     Physical Exam  Vitals and nursing note reviewed.   Constitutional:       General: She is not in acute distress.     Appearance: Normal appearance.   Cardiovascular:      Rate and Rhythm: Normal rate and regular rhythm.      Pulses: Normal pulses.      Heart sounds: Normal heart sounds. No murmur heard.  Pulmonary:      Effort: Pulmonary effort is normal. No respiratory distress.      Breath sounds: Normal breath sounds.   Abdominal:      General: Abdomen is flat. Bowel sounds are normal.      Palpations: Abdomen is soft.      Tenderness: There is no abdominal tenderness.   Musculoskeletal:      Right lower leg: No edema.      Left lower leg: No edema.   Neurological:      Mental Status: She is alert and oriented to person, place, and time. Mental status is at baseline.   Psychiatric:         Mood and Affect: Mood normal.         Behavior: Behavior normal.        Result Review :   The following data was reviewed by: Cony Michelle MD on 09/01/2023:  Labs:  Amphetamine Urine Confirmation - (02/07/2023 13:21)    [id number] 9+Oxycodone+Crt-Unbund - Urine, Clean Catch (02/07/2023 13:21)     Assessment and Plan      Diagnoses and all orders for this visit:    1. Attention deficit disorder (ADD) in adult (Primary)  Assessment & Plan:  STABLE  - cont adderall at 15 mg BID--> refill sent  - Reviewed side effects of " medication including headaches, tremors, insomnia, appetite changes, palpitations, chest pain, irritability--> pt tolerating well without any issues.   - Reviewed controlled nature of substance, potential for abuse/addiction, and possible adverse effects of medication. No red flags for abuse at this time.   - Controlled substances contract signed and in chart.   - Annual UDS screening done 02/23 and positive for amphetamines only as appropriate based on Rx  - Johnathon checked and appropriate.     Orders:  -     amphetamine-dextroamphetamine (Adderall) 15 MG tablet; Take 1 tablet by mouth 2 (Two) Times a Day.  Dispense: 60 tablet; Refill: 0    2. Anxiety and depression  Assessment & Plan:  STABLE  - will return to previous regimen of wellbutrin 300 mg daily--> refills sent  - hold on any further SSRI therapy at this time  - Reviewed potential side effects of medication including sexual performance changes, insomnia, fatigue, weight changes. Pt tolerating well without any issues.   - cont propranolol for prn use with onset of anxiety and panic              Follow Up   Return in about 3 months (around 12/1/2023) for Annual physical.    Patient was given instructions and counseling regarding her condition or for health maintenance advice. Please see specific information pulled into the AVS if appropriate.     Maria Antonia Michelle MD  [facility]  Phone: [phone]  Fax: [phone]

## 2023-09-01 NOTE — ASSESSMENT & PLAN NOTE
STABLE  - cont adderall at 15 mg BID--> refill sent  - Reviewed side effects of medication including headaches, tremors, insomnia, appetite changes, palpitations, chest pain, irritability--> pt tolerating well without any issues.   - Reviewed controlled nature of substance, potential for abuse/addiction, and possible adverse effects of medication. No red flags for abuse at this time.   - Controlled substances contract signed and in chart.   - Annual UDS screening done 02/23 and positive for amphetamines only as appropriate based on Rx  - Johnathon checked and appropriate.

## 2023-10-05 DIAGNOSIS — F98.8 ATTENTION DEFICIT DISORDER (ADD) IN ADULT: Chronic | ICD-10-CM

## 2023-10-05 RX ORDER — DEXTROAMPHETAMINE SACCHARATE, AMPHETAMINE ASPARTATE, DEXTROAMPHETAMINE SULFATE AND AMPHETAMINE SULFATE 3.75; 3.75; 3.75; 3.75 MG/1; MG/1; MG/1; MG/1
15 TABLET ORAL 2 TIMES DAILY
Qty: 60 TABLET | Refills: 0 | Status: SHIPPED | OUTPATIENT
Start: 2023-10-05

## 2023-10-05 NOTE — TELEPHONE ENCOUNTER
Rx Refill Note  Requested Prescriptions     Pending Prescriptions Disp Refills    amphetamine-dextroamphetamine (Adderall) 15 MG tablet 60 tablet 0     Sig: Take 1 tablet by mouth 2 (Two) Times a Day.      Last office visit with prescribing clinician: 9/1/2023   Last telemedicine visit with prescribing clinician: Visit date not found   Next office visit with prescribing clinician: 12/5/2023                         Would you like a call back once the refill request has been completed: [] Yes [] No    If the office needs to give you a call back, can they leave a voicemail: [] Yes [] No    Jocelyn Padilla MA  10/05/23, 08:40 EDT

## 2023-10-10 DIAGNOSIS — F41.9 ANXIETY AND DEPRESSION: Chronic | ICD-10-CM

## 2023-10-10 DIAGNOSIS — F32.A ANXIETY AND DEPRESSION: Chronic | ICD-10-CM

## 2023-10-10 RX ORDER — BUPROPION HYDROCHLORIDE 300 MG/1
300 TABLET ORAL DAILY
Qty: 90 TABLET | Refills: 1 | Status: SHIPPED | OUTPATIENT
Start: 2023-10-10

## 2023-10-10 NOTE — TELEPHONE ENCOUNTER
Rx Refill Note  Requested Prescriptions     Pending Prescriptions Disp Refills    buPROPion XL (WELLBUTRIN XL) 300 MG 24 hr tablet 90 tablet 1     Sig: Take 1 tablet by mouth Daily.      Last office visit with prescribing clinician: 9/1/2023   Last telemedicine visit with prescribing clinician: Visit date not found   Next office visit with prescribing clinician: 12/5/2023                         Would you like a call back once the refill request has been completed: [] Yes [] No    If the office needs to give you a call back, can they leave a voicemail: [] Yes [] No    Vanessa Garcia MA  10/10/23, 12:01 EDT

## 2023-10-26 ENCOUNTER — TELEPHONE (OUTPATIENT)
Dept: INTERNAL MEDICINE | Facility: CLINIC | Age: 43
End: 2023-10-26
Payer: COMMERCIAL

## 2023-10-26 NOTE — TELEPHONE ENCOUNTER
OKay for hub to read*  Called pt to make aware of current pcp leaving practice at the end of Nov 2023. (No answer\left message)

## 2024-01-18 ENCOUNTER — OFFICE VISIT (OUTPATIENT)
Dept: INTERNAL MEDICINE | Facility: CLINIC | Age: 44
End: 2024-01-18
Payer: COMMERCIAL

## 2024-01-18 VITALS
WEIGHT: 166 LBS | RESPIRATION RATE: 18 BRPM | HEIGHT: 63 IN | HEART RATE: 90 BPM | DIASTOLIC BLOOD PRESSURE: 88 MMHG | BODY MASS INDEX: 29.41 KG/M2 | SYSTOLIC BLOOD PRESSURE: 162 MMHG | OXYGEN SATURATION: 99 %

## 2024-01-18 DIAGNOSIS — F98.8 ATTENTION DEFICIT DISORDER (ADD) IN ADULT: Chronic | ICD-10-CM

## 2024-01-18 DIAGNOSIS — R53.83 OTHER FATIGUE: ICD-10-CM

## 2024-01-18 DIAGNOSIS — F32.A ANXIETY AND DEPRESSION: Primary | ICD-10-CM

## 2024-01-18 DIAGNOSIS — F41.9 ANXIETY AND DEPRESSION: Primary | ICD-10-CM

## 2024-01-18 DIAGNOSIS — R23.2 HOT FLASHES: ICD-10-CM

## 2024-01-18 DIAGNOSIS — D50.8 OTHER IRON DEFICIENCY ANEMIA: ICD-10-CM

## 2024-01-18 PROCEDURE — 99214 OFFICE O/P EST MOD 30 MIN: CPT | Performed by: STUDENT IN AN ORGANIZED HEALTH CARE EDUCATION/TRAINING PROGRAM

## 2024-01-18 RX ORDER — DEXTROAMPHETAMINE SACCHARATE, AMPHETAMINE ASPARTATE, DEXTROAMPHETAMINE SULFATE AND AMPHETAMINE SULFATE 3.75; 3.75; 3.75; 3.75 MG/1; MG/1; MG/1; MG/1
15 TABLET ORAL 2 TIMES DAILY
Qty: 60 TABLET | Refills: 0 | Status: SHIPPED | OUTPATIENT
Start: 2024-01-18

## 2024-01-18 NOTE — PROGRESS NOTES
Artur Barton,   [facility]  [address]  [place] [address] [phone]    Subjective      Name Vianey Bonilla MRN [id number]    1980 AGE/SEX 43 y.o. / female      Chief Complaint Chief Complaint   Patient presents with    Establish Care     Patient establishing care, concerned about fatigue, weight gain, skin issues     ADHD     Med check          Visit History for  2024    History of Present Illness  Vianey Bonilla is a 43 y.o. female who presented today for Establish Care (Patient establishing care, concerned about fatigue, weight gain, skin issues ) and ADHD (Med check )  Last UDS was performed in  and was appropriate.     Having issues with gaining weight.  She is having problems with fatigue as well.  Having sweats during the day.  Has been bothering her for the last 2 months. Dry skin and also having hair loss with breakage as well.  Has been taking smarty pants for women and she is also taking a prenatal for iron.    Has a hx of anemia and wondering if this is still an issue.  Also wondering about hormone levels, but currently using the ring for contraception.        Medications and Allergies   Current Outpatient Medications   Medication Instructions    amphetamine-dextroamphetamine (Adderall) 15 MG tablet 15 mg, Oral, 2 Times Daily    buPROPion XL (WELLBUTRIN XL) 300 mg, Oral, Daily    etonogestrel-ethinyl estradiol (NUVARING) 0.12-0.015 MG/24HR vaginal ring INSERT VAGINALLY AND LEAVE IN PLACE FOR 3 CONSECUTIVE WEEKS, THEN REMOVE FOR 1 WEEK    hydrOXYzine (ATARAX) 10 mg, Oral, 3 Times Daily PRN    pantoprazole (PROTONIX) 40 MG EC tablet TAKE 1 TABLET DAILY    propranolol (INDERAL) 40 mg, Oral, 3 Times Daily PRN    SUMAtriptan (IMITREX) 25 mg, Oral, Every 2 Hours PRN, Take one tablet at onset of headache. May repeat dose one time in 2 hours if headache not relieved.    valACYclovir (VALTREX) 2,000 mg, Oral, 2 Times Daily      "Allergies   Allergen Reactions    Ceclor [Cefaclor]     Percocet [Oxycodone-Acetaminophen] Hives    Sulfa Antibiotics       I have reviewed the above medications and allergies     Objective:      Vitals Vitals:    01/18/24 1019   BP: 162/88   BP Location: Left arm   Patient Position: Sitting   Cuff Size: Adult   Pulse: 90   Resp: 18   SpO2: 99%   Weight: 75.3 kg (166 lb)   Height: 160 cm (63\")     Body mass index is 29.41 kg/m².    Physical Exam  Vitals reviewed.   Constitutional:       General: She is not in acute distress.     Appearance: She is not ill-appearing.   Cardiovascular:      Rate and Rhythm: Normal rate and regular rhythm.   Pulmonary:      Effort: Pulmonary effort is normal.      Breath sounds: Normal breath sounds.   Musculoskeletal:      Cervical back: Neck supple.   Psychiatric:         Mood and Affect: Mood normal.         Behavior: Behavior normal.         Thought Content: Thought content normal.         Judgment: Judgment normal.            Assessment/Plan      Issues Addressed/ Plan   Diagnosis Plan   1. Anxiety and depression  TSH    T4, free      2. Other fatigue  Comprehensive metabolic panel    TSH    T4, free    CBC w AUTO Differential    T3, free      3. Hot flashes  Comprehensive metabolic panel    TSH    T4, free    Hemoglobin A1c    CBC w AUTO Differential    Iron Profile    Vitamin D 25 hydroxy      4. Other iron deficiency anemia  CBC w AUTO Differential      5. Attention deficit disorder (ADD) in adult  amphetamine-dextroamphetamine (Adderall) 15 MG tablet        -Patient has a history of anxiety and depression and currently on medication that is effective, but now having fatigue, hot flashes, and other symptoms.  Unclear etiology at this time.  Going to check labs today for possible metabolic dysfunction.  Possible thyroid dysfunction as well.  - Going to refill patient medication for ADHD.  Continues to be compliant.  No unwanted side effects from medication.    - Going to have " patient return in 2 weeks in order to discuss lab findings as well as other possible reasons behind her current symptoms.  There are no Patient Instructions on file for this visit.        Follow up  recommended Return in about 2 weeks (around 2/1/2024) for Wt and fatigue.   - Dragon voice recognition software was utilized to complete this chart.  Every reasonable attempt was made to edit and correct the text, however some incorrect words may remain.   Artur Barton, DO

## 2024-01-19 LAB
25(OH)D3+25(OH)D2 SERPL-MCNC: 40.7 NG/ML (ref 30–100)
ALBUMIN SERPL-MCNC: 4.3 G/DL (ref 3.5–5.2)
ALBUMIN/GLOB SERPL: 1.7 G/DL
ALP SERPL-CCNC: 79 U/L (ref 39–117)
ALT SERPL-CCNC: 20 U/L (ref 1–33)
AST SERPL-CCNC: 18 U/L (ref 1–32)
BASOPHILS # BLD AUTO: 0.06 10*3/MM3 (ref 0–0.2)
BASOPHILS NFR BLD AUTO: 0.8 % (ref 0–1.5)
BILIRUB SERPL-MCNC: 0.4 MG/DL (ref 0–1.2)
BUN SERPL-MCNC: 9 MG/DL (ref 6–20)
BUN/CREAT SERPL: 10 (ref 7–25)
CALCIUM SERPL-MCNC: 9.2 MG/DL (ref 8.6–10.5)
CHLORIDE SERPL-SCNC: 107 MMOL/L (ref 98–107)
CO2 SERPL-SCNC: 22.6 MMOL/L (ref 22–29)
CREAT SERPL-MCNC: 0.9 MG/DL (ref 0.57–1)
EGFRCR SERPLBLD CKD-EPI 2021: 81 ML/MIN/1.73
EOSINOPHIL # BLD AUTO: 0.37 10*3/MM3 (ref 0–0.4)
EOSINOPHIL NFR BLD AUTO: 5.2 % (ref 0.3–6.2)
ERYTHROCYTE [DISTWIDTH] IN BLOOD BY AUTOMATED COUNT: 11.8 % (ref 12.3–15.4)
GLOBULIN SER CALC-MCNC: 2.6 GM/DL
GLUCOSE SERPL-MCNC: 78 MG/DL (ref 65–99)
HBA1C MFR BLD: 5 % (ref 4.8–5.6)
HCT VFR BLD AUTO: 38.9 % (ref 34–46.6)
HGB BLD-MCNC: 13.1 G/DL (ref 12–15.9)
IMM GRANULOCYTES # BLD AUTO: 0.04 10*3/MM3 (ref 0–0.05)
IMM GRANULOCYTES NFR BLD AUTO: 0.6 % (ref 0–0.5)
IRON SATN MFR SERPL: 24 % (ref 20–50)
IRON SERPL-MCNC: 114 MCG/DL (ref 37–145)
LYMPHOCYTES # BLD AUTO: 2.7 10*3/MM3 (ref 0.7–3.1)
LYMPHOCYTES NFR BLD AUTO: 38 % (ref 19.6–45.3)
MCH RBC QN AUTO: 30 PG (ref 26.6–33)
MCHC RBC AUTO-ENTMCNC: 33.7 G/DL (ref 31.5–35.7)
MCV RBC AUTO: 89.2 FL (ref 79–97)
MONOCYTES # BLD AUTO: 0.54 10*3/MM3 (ref 0.1–0.9)
MONOCYTES NFR BLD AUTO: 7.6 % (ref 5–12)
NEUTROPHILS # BLD AUTO: 3.39 10*3/MM3 (ref 1.7–7)
NEUTROPHILS NFR BLD AUTO: 47.8 % (ref 42.7–76)
NRBC BLD AUTO-RTO: 0 /100 WBC (ref 0–0.2)
PLATELET # BLD AUTO: 332 10*3/MM3 (ref 140–450)
POTASSIUM SERPL-SCNC: 4 MMOL/L (ref 3.5–5.2)
PROT SERPL-MCNC: 6.9 G/DL (ref 6–8.5)
RBC # BLD AUTO: 4.36 10*6/MM3 (ref 3.77–5.28)
SODIUM SERPL-SCNC: 141 MMOL/L (ref 136–145)
T3FREE SERPL-MCNC: 3.7 PG/ML (ref 2–4.4)
T4 FREE SERPL-MCNC: 1.09 NG/DL (ref 0.93–1.7)
TIBC SERPL-MCNC: 480 MCG/DL
TSH SERPL DL<=0.005 MIU/L-ACNC: 3.63 UIU/ML (ref 0.27–4.2)
UIBC SERPL-MCNC: 366 MCG/DL (ref 112–346)
WBC # BLD AUTO: 7.1 10*3/MM3 (ref 3.4–10.8)

## 2024-01-27 ENCOUNTER — PATIENT ROUNDING (BHMG ONLY) (OUTPATIENT)
Dept: INTERNAL MEDICINE | Facility: CLINIC | Age: 44
End: 2024-01-27
Payer: COMMERCIAL

## 2024-01-28 NOTE — PROGRESS NOTES
A My-Chart message has been sent to the patient for Patient Rounding with [facility].

## 2024-02-08 ENCOUNTER — OFFICE VISIT (OUTPATIENT)
Dept: INTERNAL MEDICINE | Facility: CLINIC | Age: 44
End: 2024-02-08
Payer: COMMERCIAL

## 2024-02-08 VITALS
HEIGHT: 63 IN | RESPIRATION RATE: 18 BRPM | WEIGHT: 165 LBS | OXYGEN SATURATION: 99 % | HEART RATE: 101 BPM | DIASTOLIC BLOOD PRESSURE: 88 MMHG | BODY MASS INDEX: 29.23 KG/M2 | SYSTOLIC BLOOD PRESSURE: 130 MMHG

## 2024-02-08 DIAGNOSIS — Z00.00 ENCOUNTER FOR WELL ADULT EXAM WITHOUT ABNORMAL FINDINGS: Primary | ICD-10-CM

## 2024-02-08 DIAGNOSIS — F98.8 ATTENTION DEFICIT DISORDER (ADD) IN ADULT: Chronic | ICD-10-CM

## 2024-02-08 PROCEDURE — 99396 PREV VISIT EST AGE 40-64: CPT | Performed by: STUDENT IN AN ORGANIZED HEALTH CARE EDUCATION/TRAINING PROGRAM

## 2024-02-08 RX ORDER — LISDEXAMFETAMINE DIMESYLATE CAPSULES 30 MG/1
30 CAPSULE ORAL EVERY MORNING
Qty: 30 CAPSULE | Refills: 0 | Status: SHIPPED | OUTPATIENT
Start: 2024-02-08 | End: 2024-02-16

## 2024-02-12 ENCOUNTER — PATIENT MESSAGE (OUTPATIENT)
Dept: INTERNAL MEDICINE | Facility: CLINIC | Age: 44
End: 2024-02-12

## 2024-02-12 ENCOUNTER — TELEPHONE (OUTPATIENT)
Dept: FAMILY MEDICINE CLINIC | Facility: CLINIC | Age: 44
End: 2024-02-12
Payer: COMMERCIAL

## 2024-02-12 DIAGNOSIS — F98.8 ATTENTION DEFICIT DISORDER (ADD) IN ADULT: Primary | Chronic | ICD-10-CM

## 2024-02-16 DIAGNOSIS — F98.8 ATTENTION DEFICIT DISORDER (ADD) IN ADULT: Chronic | ICD-10-CM

## 2024-02-16 RX ORDER — DEXTROAMPHETAMINE SACCHARATE, AMPHETAMINE ASPARTATE, DEXTROAMPHETAMINE SULFATE AND AMPHETAMINE SULFATE 3.75; 3.75; 3.75; 3.75 MG/1; MG/1; MG/1; MG/1
15 TABLET ORAL 2 TIMES DAILY
Qty: 60 TABLET | Refills: 0 | OUTPATIENT
Start: 2024-02-16

## 2024-02-16 RX ORDER — DEXTROAMPHETAMINE SACCHARATE, AMPHETAMINE ASPARTATE MONOHYDRATE, DEXTROAMPHETAMINE SULFATE AND AMPHETAMINE SULFATE 5; 5; 5; 5 MG/1; MG/1; MG/1; MG/1
20 CAPSULE, EXTENDED RELEASE ORAL EVERY MORNING
Qty: 30 CAPSULE | Refills: 0 | Status: SHIPPED | OUTPATIENT
Start: 2024-02-16

## 2024-03-07 ENCOUNTER — OFFICE VISIT (OUTPATIENT)
Dept: INTERNAL MEDICINE | Facility: CLINIC | Age: 44
End: 2024-03-07
Payer: COMMERCIAL

## 2024-03-07 VITALS
WEIGHT: 165 LBS | DIASTOLIC BLOOD PRESSURE: 90 MMHG | HEIGHT: 63 IN | RESPIRATION RATE: 20 BRPM | BODY MASS INDEX: 29.23 KG/M2 | SYSTOLIC BLOOD PRESSURE: 132 MMHG | HEART RATE: 102 BPM | OXYGEN SATURATION: 98 %

## 2024-03-07 DIAGNOSIS — F51.01 PRIMARY INSOMNIA: Primary | ICD-10-CM

## 2024-03-07 DIAGNOSIS — F98.8 ATTENTION DEFICIT DISORDER (ADD) IN ADULT: Chronic | ICD-10-CM

## 2024-03-07 PROCEDURE — 99214 OFFICE O/P EST MOD 30 MIN: CPT | Performed by: STUDENT IN AN ORGANIZED HEALTH CARE EDUCATION/TRAINING PROGRAM

## 2024-03-07 RX ORDER — TRAZODONE HYDROCHLORIDE 50 MG/1
50 TABLET ORAL NIGHTLY
Qty: 30 TABLET | Refills: 0 | Status: SHIPPED | OUTPATIENT
Start: 2024-03-07

## 2024-03-07 RX ORDER — DEXTROAMPHETAMINE SACCHARATE, AMPHETAMINE ASPARTATE MONOHYDRATE, DEXTROAMPHETAMINE SULFATE AND AMPHETAMINE SULFATE 6.25; 6.25; 6.25; 6.25 MG/1; MG/1; MG/1; MG/1
25 CAPSULE, EXTENDED RELEASE ORAL EVERY MORNING
Qty: 30 CAPSULE | Refills: 0 | Status: SHIPPED | OUTPATIENT
Start: 2024-03-07

## 2024-03-07 NOTE — PROGRESS NOTES
Artur Barton,   [facility]  [facility]  [place] [id number] [phone]    Subjective      Name Vianey Bonilla MRN [id number]    1980 AGE/SEX 43 y.o. / female      Chief Complaint Chief Complaint   Patient presents with    ADHD     One month follow up     Weight Check         Visit History for  2024    History of Present Illness  Vianey Bonilla is a 43 y.o. female who presented today for ADHD (One month follow up ) and Weight Check    Patient been trying to utilize Adderall and intermittent fasting for weight loss.  She is doing the 16 8.  Feels like she has not lost enough weight since last evaluation.    Patient states that she does not sleep well at night.  She will wake up in the middle the night at about 1 or 2:00 in the morning every morning.  Does not typically get a full night sleep.  Almost always has to take a nap during the middle of the day in order for her to maintain energy.  Typically does not have a problem falling asleep      Medications and Allergies   Current Outpatient Medications   Medication Instructions    amphetamine-dextroamphetamine XR (ADDERALL XR) 25 MG 24 hr capsule 25 mg, Oral, Every Morning    buPROPion XL (WELLBUTRIN XL) 300 mg, Oral, Daily    etonogestrel-ethinyl estradiol (NUVARING) 0.12-0.015 MG/24HR vaginal ring INSERT VAGINALLY AND LEAVE IN PLACE FOR 3 CONSECUTIVE WEEKS, THEN REMOVE FOR 1 WEEK    pantoprazole (PROTONIX) 40 MG EC tablet TAKE 1 TABLET DAILY    propranolol (INDERAL) 40 mg, Oral, 3 Times Daily PRN    traZODone (DESYREL) 50 mg, Oral, Nightly    valACYclovir (VALTREX) 2,000 mg, Oral, 2 Times Daily     Allergies   Allergen Reactions    Ceclor [Cefaclor]     Percocet [Oxycodone-Acetaminophen] Hives    Sulfa Antibiotics       I have reviewed the above medications and allergies     Objective:      Vitals Vitals:    24 0800   BP: 132/90   BP Location: Left arm   Patient Position: Sitting   Cuff  "Size: Adult   Pulse: 102   Resp: 20   SpO2: 98%   Weight: 74.8 kg (165 lb)   Height: 160 cm (63\")     Body mass index is 29.23 kg/m².    Physical Exam  Vitals reviewed.   Constitutional:       General: She is not in acute distress.     Appearance: She is not ill-appearing.   Pulmonary:      Effort: Pulmonary effort is normal.   Psychiatric:         Mood and Affect: Mood normal.         Behavior: Behavior normal.         Thought Content: Thought content normal.         Judgment: Judgment normal.            Assessment/Plan      Issues Addressed/ Plan   Diagnosis Plan   1. Primary insomnia  traZODone (DESYREL) 50 MG tablet      2. Attention deficit disorder (ADD) in adult  amphetamine-dextroamphetamine XR (ADDERALL XR) 25 MG 24 hr capsule      -Having problems with sleep latency.  She would like to avoid medication if possible.  However, I do feel that she is not getting enough sleep in order for her other medications to be completely effective.  She typically will go to bed okay however she will wake up at approximately 2 or 3:00 in the morning and has a difficult time falling back asleep.  Going to try to utilize trazodone at this time to help her stay asleep another option would be Seroquel.  We did discuss all these different options and trying to avoid controlled substances.  - Going to increase her Adderall dose from 20 to 25 mg.  Hopefully this will help with her daytime tiredness as well as her weight loss.  - She continues to utilize intermittent fasting and I encouraged her to continue although she was disappointed with her weight loss.  I do feel that 1 to 2 pounds a month is acceptable as cumulatively this will add up over time.  We discussed weight loss goals and trying to look at small weight loss as successful.  She continues to exercise as well.  There are no Patient Instructions on file for this visit.            Follow up  recommended Return in about 1 month (around 4/7/2024) for Insomnia.   - Dragon " voice recognition software was utilized to complete this chart.  Every reasonable attempt was made to edit and correct the text, however some incorrect words may remain.   Artur Barton, DO

## 2024-04-11 ENCOUNTER — OFFICE VISIT (OUTPATIENT)
Dept: INTERNAL MEDICINE | Facility: CLINIC | Age: 44
End: 2024-04-11
Payer: COMMERCIAL

## 2024-04-11 VITALS
SYSTOLIC BLOOD PRESSURE: 138 MMHG | HEART RATE: 86 BPM | HEIGHT: 63 IN | DIASTOLIC BLOOD PRESSURE: 82 MMHG | BODY MASS INDEX: 28.88 KG/M2 | RESPIRATION RATE: 16 BRPM | OXYGEN SATURATION: 98 % | WEIGHT: 163 LBS

## 2024-04-11 DIAGNOSIS — F98.8 ATTENTION DEFICIT DISORDER (ADD) IN ADULT: Primary | Chronic | ICD-10-CM

## 2024-04-11 DIAGNOSIS — F51.01 PRIMARY INSOMNIA: ICD-10-CM

## 2024-04-11 DIAGNOSIS — B00.2 RECURRENT ORAL HERPES SIMPLEX: Chronic | ICD-10-CM

## 2024-04-11 PROCEDURE — 99214 OFFICE O/P EST MOD 30 MIN: CPT | Performed by: STUDENT IN AN ORGANIZED HEALTH CARE EDUCATION/TRAINING PROGRAM

## 2024-04-11 RX ORDER — AMPHETAMINE 15.7 MG/1
15.7 TABLET, ORALLY DISINTEGRATING ORAL DAILY
Qty: 30 EACH | Refills: 0 | Status: SHIPPED | OUTPATIENT
Start: 2024-04-11

## 2024-04-11 RX ORDER — TRAZODONE HYDROCHLORIDE 50 MG/1
50 TABLET ORAL NIGHTLY
Qty: 30 TABLET | Refills: 3 | Status: SHIPPED | OUTPATIENT
Start: 2024-04-11

## 2024-04-11 RX ORDER — VALACYCLOVIR HYDROCHLORIDE 1 G/1
2000 TABLET, FILM COATED ORAL 2 TIMES DAILY
Qty: 4 TABLET | Refills: 4 | Status: SHIPPED | OUTPATIENT
Start: 2024-04-11

## 2024-04-15 DIAGNOSIS — Z30.44 ENCOUNTER FOR SURVEILLANCE OF VAGINAL RING HORMONAL CONTRACEPTIVE DEVICE: Chronic | ICD-10-CM

## 2024-04-15 RX ORDER — ETONOGESTREL AND ETHINYL ESTRADIOL VAGINAL RING .015; .12 MG/D; MG/D
RING VAGINAL
Qty: 3 EACH | Refills: 3 | Status: SHIPPED | OUTPATIENT
Start: 2024-04-15

## 2024-04-23 DIAGNOSIS — F41.9 ANXIETY AND DEPRESSION: Chronic | ICD-10-CM

## 2024-04-23 DIAGNOSIS — F32.A ANXIETY AND DEPRESSION: Chronic | ICD-10-CM

## 2024-04-23 NOTE — TELEPHONE ENCOUNTER
Rx Refill Note  Requested Prescriptions      No prescriptions requested or ordered in this encounter      Last office visit with prescribing clinician: 4/11/2024   Last telemedicine visit with prescribing clinician: Visit date not found   Next office visit with prescribing clinician: 7/11/2024

## 2024-04-24 RX ORDER — BUPROPION HYDROCHLORIDE 300 MG/1
300 TABLET ORAL DAILY
Qty: 90 TABLET | Refills: 1 | Status: SHIPPED | OUTPATIENT
Start: 2024-04-24

## 2024-04-29 NOTE — PROGRESS NOTES
Artur Barton,   [facility]  [facility]  [place] [id number] [phone]    Subjective      Name Vianey Bonilla MRN [id number]    1980 AGE/SEX 43 y.o. / female      Chief Complaint Chief Complaint   Patient presents with    Insomnia     Follow up          Visit History for  2024    History of Present Illness  Vianey Bonilla is a 43 y.o. female who presented today for Insomnia (Follow up )      The patient has been adhering to a regimen of trazodone, initiated at a dosage of 1 tablet daily for a week. However, she found the dosage insufficient, leading to a reduction to 3/4 of a tablet. Currently, she is on a regimen of 1 trazodone tablet, taken twice a week, primarily on weekends or when she is not required to attend office work. The medication does not induce morning fogginess, contingent upon her dietary intake. She requires a refill of her trazodone prescription, which she uses as needed, particularly during flare-ups.    The patient reports a weight loss of 2 pounds, which she attributes to a recent softball tournament in [place]. She attributes this weight loss to a period of restful sleep and a general sense of well-being following her softball tournament.    The patient continues to experience adverse effects from Adderall, particularly around 4:00 PM. Her sister-in-law suggested a similar medication similar to Adderall, which dissolves spontaneously. The patient finds that a cup of coffee and soda is ineffective in managing her symptoms. She takes her double dose of Adderall at 9:00 AM. Her insurance does not cover Vyvanse.        Medications and Allergies   Current Outpatient Medications   Medication Instructions    adZENys XR-ODT 15.7 mg, Translingual, Daily    buPROPion XL (WELLBUTRIN XL) 300 mg, Oral, Daily    etonogestrel-ethinyl estradiol (NUVARING) 0.12-0.015 MG/24HR vaginal ring INSERT VAGINALLY AND LEAVE IN PLACE FOR 3  "CONSECUTIVE WEEKS, THEN REMOVE FOR 1 WEEK    pantoprazole (PROTONIX) 40 MG EC tablet TAKE 1 TABLET DAILY    propranolol (INDERAL) 40 mg, Oral, 3 Times Daily PRN    traZODone (DESYREL) 50 mg, Oral, Nightly    valACYclovir (VALTREX) 2,000 mg, Oral, 2 Times Daily     Allergies   Allergen Reactions    Ceclor [Cefaclor]     Percocet [Oxycodone-Acetaminophen] Hives    Sulfa Antibiotics       I have reviewed the above medications and allergies     Objective:      Vitals Vitals:    04/11/24 1002   BP: 138/82   BP Location: Left arm   Patient Position: Sitting   Cuff Size: Adult   Pulse: 86   Resp: 16   SpO2: 98%   Weight: 73.9 kg (163 lb)   Height: 160 cm (63\")     Body mass index is 28.87 kg/m².    Physical Exam  Vitals reviewed.   Constitutional:       General: She is not in acute distress.     Appearance: She is not ill-appearing.   Pulmonary:      Effort: Pulmonary effort is normal.   Psychiatric:         Mood and Affect: Mood normal.         Behavior: Behavior normal.         Thought Content: Thought content normal.         Judgment: Judgment normal.       Physical Exam     Results          Assessment/Plan      Issues Addressed/ Plan   Diagnosis Plan   1. Attention deficit disorder (ADD) in adult  Amphetamine ER (adZENys XR-ODT) 15.7 MG Tablet Extended Release Dispersible      2. Recurrent oral herpes simplex  valACYclovir (Valtrex) 1000 MG tablet      3. Primary insomnia  traZODone (DESYREL) 50 MG tablet         Assessment & Plan  1. Insomnia.  The patient is advised to persist with the trazodone regimen as needed.    2. Weight management.  The patient was commended for her progress, noting a weight loss of 1 to 2 pounds per month.    3. Attention Deficit Hyperactivity Disorder (ADHD).  A prescription for Vyvanse 12.5 mg, will be issued. The patient is advised to take the second dose of immediate-release Adderall at 1:00 PM, which will extend her period from 6:00 PM to 7:00 PM. Should the Vyvanse prove ineffective, " the patient is to inform me, and a second dose of 10 mg will be prescribed.    Follow-up  The patient is scheduled for a follow-up visit in 3 months.           There are no Patient Instructions on file for this visit.        Follow up  recommended Return in about 3 months (around 7/11/2024).   - Dragon voice recognition software was utilized to complete this chart.  Every reasonable attempt was made to edit and correct the text, however some incorrect words may remain.   Artur Barton DO    Patient or patient representative verbalized consent for the use of Ambient Listening during the visit with  Artur Barton DO for chart documentation. 4/29/2024  01:01 EDT

## 2024-04-30 ENCOUNTER — PATIENT MESSAGE (OUTPATIENT)
Dept: INTERNAL MEDICINE | Facility: CLINIC | Age: 44
End: 2024-04-30
Payer: COMMERCIAL

## 2024-05-08 ENCOUNTER — TELEPHONE (OUTPATIENT)
Dept: FAMILY MEDICINE CLINIC | Facility: CLINIC | Age: 44
End: 2024-05-08
Payer: COMMERCIAL

## 2024-05-08 DIAGNOSIS — F98.8 ATTENTION DEFICIT DISORDER (ADD) IN ADULT: Primary | ICD-10-CM

## 2024-05-08 DIAGNOSIS — F51.01 PRIMARY INSOMNIA: ICD-10-CM

## 2024-05-08 RX ORDER — TRAZODONE HYDROCHLORIDE 50 MG/1
50 TABLET ORAL NIGHTLY
Qty: 30 TABLET | Refills: 3 | Status: SHIPPED | OUTPATIENT
Start: 2024-05-08

## 2024-05-08 RX ORDER — DEXTROAMPHETAMINE SACCHARATE, AMPHETAMINE ASPARTATE MONOHYDRATE, DEXTROAMPHETAMINE SULFATE AND AMPHETAMINE SULFATE 6.25; 6.25; 6.25; 6.25 MG/1; MG/1; MG/1; MG/1
25 CAPSULE, EXTENDED RELEASE ORAL EVERY MORNING
Qty: 30 CAPSULE | Refills: 0 | Status: SHIPPED | OUTPATIENT
Start: 2024-05-08

## 2024-05-15 DIAGNOSIS — Z87.11 HISTORY OF GASTRIC ULCER: ICD-10-CM

## 2024-05-15 RX ORDER — PANTOPRAZOLE SODIUM 40 MG/1
TABLET, DELAYED RELEASE ORAL
Qty: 90 TABLET | Refills: 3 | Status: SHIPPED | OUTPATIENT
Start: 2024-05-15

## 2024-06-13 DIAGNOSIS — F98.8 ATTENTION DEFICIT DISORDER (ADD) IN ADULT: ICD-10-CM

## 2024-06-13 NOTE — TELEPHONE ENCOUNTER
Rx Refill Note  Requested Prescriptions     Pending Prescriptions Disp Refills    amphetamine-dextroamphetamine XR (Adderall XR) 25 MG 24 hr capsule 30 capsule 0     Sig: Take 1 capsule by mouth Every Morning      Last office visit with prescribing clinician: 4/11/2024   Last telemedicine visit with prescribing clinician: Visit date not found   Next office visit with prescribing clinician: 7/11/2024    Last OV 4/11/24  Second refill: 5/8/24  Third Refill:

## 2024-06-14 RX ORDER — DEXTROAMPHETAMINE SACCHARATE, AMPHETAMINE ASPARTATE MONOHYDRATE, DEXTROAMPHETAMINE SULFATE AND AMPHETAMINE SULFATE 6.25; 6.25; 6.25; 6.25 MG/1; MG/1; MG/1; MG/1
25 CAPSULE, EXTENDED RELEASE ORAL EVERY MORNING
Qty: 30 CAPSULE | Refills: 0 | Status: SHIPPED | OUTPATIENT
Start: 2024-06-14

## 2024-07-11 ENCOUNTER — OFFICE VISIT (OUTPATIENT)
Dept: INTERNAL MEDICINE | Facility: CLINIC | Age: 44
End: 2024-07-11
Payer: COMMERCIAL

## 2024-07-11 VITALS
HEART RATE: 91 BPM | SYSTOLIC BLOOD PRESSURE: 134 MMHG | OXYGEN SATURATION: 98 % | HEIGHT: 63 IN | DIASTOLIC BLOOD PRESSURE: 88 MMHG | RESPIRATION RATE: 18 BRPM | WEIGHT: 167 LBS | BODY MASS INDEX: 29.59 KG/M2

## 2024-07-11 DIAGNOSIS — F51.01 PRIMARY INSOMNIA: ICD-10-CM

## 2024-07-11 DIAGNOSIS — F98.8 ATTENTION DEFICIT DISORDER (ADD) IN ADULT: ICD-10-CM

## 2024-07-11 PROCEDURE — 99213 OFFICE O/P EST LOW 20 MIN: CPT | Performed by: STUDENT IN AN ORGANIZED HEALTH CARE EDUCATION/TRAINING PROGRAM

## 2024-07-11 RX ORDER — TRAZODONE HYDROCHLORIDE 50 MG/1
50 TABLET ORAL NIGHTLY
Qty: 30 TABLET | Refills: 3 | Status: SHIPPED | OUTPATIENT
Start: 2024-07-11

## 2024-07-11 RX ORDER — DEXTROAMPHETAMINE SACCHARATE, AMPHETAMINE ASPARTATE MONOHYDRATE, DEXTROAMPHETAMINE SULFATE AND AMPHETAMINE SULFATE 6.25; 6.25; 6.25; 6.25 MG/1; MG/1; MG/1; MG/1
25 CAPSULE, EXTENDED RELEASE ORAL EVERY MORNING
Qty: 30 CAPSULE | Refills: 0 | Status: SHIPPED | OUTPATIENT
Start: 2024-07-11

## 2024-07-13 NOTE — PROGRESS NOTES
Artur Barton,   [facility] PRIMARY CARE  [facility]  [place] [id number] [phone]    Subjective      Name Vianey Bonilla MRN [id number]    1980 AGE/SEX 44 y.o. / female      Chief Complaint Chief Complaint   Patient presents with    ADHD     Check up          Visit History for  2024    Vianey Bonilla is a 44 y.o. female who presented today for ADHD (Check up )     History of Present Illness  The patient presents for evaluation of multiple medical concerns.    She expresses concern about her weight. She has been practicing fasting, not eating after 8:00 PM and not eating until about 12:00 PM, depending on her busy afternoon. For physical activity, she tries to walk every night unless it is too hot, and then walks 1 or 2 miles at [employer] or [employer]. Two years ago, she weighed 30 pounds lighter. Her diet does not include steak, cake, or sodas.    She is currently working from home, working from 7:00 AM to 7:00 PM, which is causing her significant stress. 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 She discovered a lump and plans to have it examined today. She also plans to see her OB today due to severe hot flashes. She is currently using a birth control ring. She has been working weekly for almost 5 years. She is seeking a refill of her Adderall prescription. Trazodone has been effective in helping her sleep and does not cause her to feel groggy.       Medications and Allergies   Current Outpatient Medications   Medication Instructions    amphetamine-dextroamphetamine XR (Adderall XR) 25 MG 24 hr capsule 25 mg, Oral, Every Morning    buPROPion XL (WELLBUTRIN XL) 300 mg, Oral, Daily    etonogestrel-ethinyl estradiol (NUVARING) 0.12-0.015 MG/24HR vaginal ring INSERT VAGINALLY AND LEAVE IN PLACE FOR 3 CONSECUTIVE WEEKS, THEN REMOVE FOR 1 WEEK    pantoprazole (PROTONIX) 40 MG EC tablet TAKE 1 TABLET DAILY    propranolol (INDERAL) 40 mg,  "Oral, 3 Times Daily PRN    traZODone (DESYREL) 50 mg, Oral, Nightly    valACYclovir (VALTREX) 2,000 mg, Oral, 2 Times Daily     Allergies   Allergen Reactions    Ceclor [Cefaclor]     Percocet [Oxycodone-Acetaminophen] Hives    Sulfa Antibiotics       I have reviewed the above medications and allergies     Objective:      Vitals Vitals:    07/11/24 0812   BP: 134/88   BP Location: Left arm   Patient Position: Sitting   Cuff Size: Adult   Pulse: 91   Resp: 18   SpO2: 98%   Weight: 75.8 kg (167 lb)   Height: 160 cm (63\")     Body mass index is 29.58 kg/m².    Physical Exam  Vitals reviewed.   Constitutional:       General: She is not in acute distress.     Appearance: She is not ill-appearing.   Pulmonary:      Effort: Pulmonary effort is normal.   Psychiatric:         Mood and Affect: Mood normal.         Behavior: Behavior normal.         Thought Content: Thought content normal.         Judgment: Judgment normal.        Physical Exam       Results       Assessment/Plan   Issues Addressed/ Plan   Diagnosis Plan   1. Attention deficit disorder (ADD) in adult  amphetamine-dextroamphetamine XR (Adderall XR) 25 MG 24 hr capsule      2. Primary insomnia  traZODone (DESYREL) 50 MG tablet         Assessment & Plan  1. Weight management.  Her weight gain is likely stress-related. Her lab results and blood pressure are within normal limits. She was advised to maintain her current lifestyle.  She can continue on her journey with weight loss once she has been able to decrease life stressors.  No further changes or additions needed to medication at this time.    2. Medication refill.  Prescriptions for Adderall and trazodone were renewed.    Follow-up  A follow-up appointment is scheduled for 3 months from now.           There are no Patient Instructions on file for this visit.   Follow up  recommended Return in about 3 months (around 10/11/2024) for adhd.   - Dragon voice recognition software was utilized to complete this " chart.  Every reasonable attempt was made to edit and correct the text, however some incorrect words may remain.   Artur Barton DO    Patient or patient representative verbalized consent for the use of Ambient Listening during the visit with  Artur Barton DO for chart documentation. 7/13/2024  02:11 EDT

## 2024-07-15 ENCOUNTER — APPOINTMENT (OUTPATIENT)
Dept: WOMENS IMAGING | Facility: HOSPITAL | Age: 44
End: 2024-07-15
Payer: COMMERCIAL

## 2024-07-15 PROCEDURE — 76642 ULTRASOUND BREAST LIMITED: CPT | Performed by: RADIOLOGY

## 2024-07-15 PROCEDURE — 77066 DX MAMMO INCL CAD BI: CPT | Performed by: RADIOLOGY

## 2024-07-15 PROCEDURE — G0279 TOMOSYNTHESIS, MAMMO: HCPCS | Performed by: RADIOLOGY

## 2024-07-15 PROCEDURE — 77062 BREAST TOMOSYNTHESIS BI: CPT | Performed by: RADIOLOGY

## 2024-08-01 ENCOUNTER — APPOINTMENT (OUTPATIENT)
Dept: WOMENS IMAGING | Facility: HOSPITAL | Age: 44
End: 2024-08-01
Payer: COMMERCIAL

## 2024-08-01 PROCEDURE — 76942 ECHO GUIDE FOR BIOPSY: CPT | Performed by: RADIOLOGY

## 2024-08-01 PROCEDURE — 19000 PUNCTURE ASPIR CYST BREAST: CPT | Performed by: RADIOLOGY

## 2024-08-16 DIAGNOSIS — F98.8 ATTENTION DEFICIT DISORDER (ADD) IN ADULT: ICD-10-CM

## 2024-08-16 NOTE — TELEPHONE ENCOUNTER
Rx Refill Note  Requested Prescriptions     Pending Prescriptions Disp Refills    amphetamine-dextroamphetamine XR (Adderall XR) 25 MG 24 hr capsule 30 capsule 0     Sig: Take 1 capsule by mouth Every Morning      Last office visit with prescribing clinician: 7/11/2024   Last telemedicine visit with prescribing clinician: Visit date not found   Next office visit with prescribing clinician: Visit date not found    7/11/24

## 2024-08-17 RX ORDER — DEXTROAMPHETAMINE SACCHARATE, AMPHETAMINE ASPARTATE MONOHYDRATE, DEXTROAMPHETAMINE SULFATE AND AMPHETAMINE SULFATE 6.25; 6.25; 6.25; 6.25 MG/1; MG/1; MG/1; MG/1
25 CAPSULE, EXTENDED RELEASE ORAL EVERY MORNING
Qty: 30 CAPSULE | Refills: 0 | Status: SHIPPED | OUTPATIENT
Start: 2024-08-17

## 2024-09-26 DIAGNOSIS — F98.8 ATTENTION DEFICIT DISORDER (ADD) IN ADULT: ICD-10-CM

## 2024-09-30 RX ORDER — DEXTROAMPHETAMINE SACCHARATE, AMPHETAMINE ASPARTATE MONOHYDRATE, DEXTROAMPHETAMINE SULFATE AND AMPHETAMINE SULFATE 6.25; 6.25; 6.25; 6.25 MG/1; MG/1; MG/1; MG/1
25 CAPSULE, EXTENDED RELEASE ORAL EVERY MORNING
Qty: 30 CAPSULE | Refills: 0 | Status: SHIPPED | OUTPATIENT
Start: 2024-09-30

## 2024-10-14 DIAGNOSIS — F32.A ANXIETY AND DEPRESSION: Chronic | ICD-10-CM

## 2024-10-14 DIAGNOSIS — F41.9 ANXIETY AND DEPRESSION: Chronic | ICD-10-CM

## 2024-10-14 RX ORDER — BUPROPION HYDROCHLORIDE 300 MG/1
300 TABLET ORAL DAILY
Qty: 90 TABLET | Refills: 3 | Status: SHIPPED | OUTPATIENT
Start: 2024-10-14

## 2024-10-28 DIAGNOSIS — F98.8 ATTENTION DEFICIT DISORDER (ADD) IN ADULT: ICD-10-CM

## 2024-10-28 RX ORDER — DEXTROAMPHETAMINE SACCHARATE, AMPHETAMINE ASPARTATE MONOHYDRATE, DEXTROAMPHETAMINE SULFATE AND AMPHETAMINE SULFATE 6.25; 6.25; 6.25; 6.25 MG/1; MG/1; MG/1; MG/1
25 CAPSULE, EXTENDED RELEASE ORAL EVERY MORNING
Qty: 30 CAPSULE | Refills: 0 | Status: SHIPPED | OUTPATIENT
Start: 2024-10-28

## 2024-11-14 ENCOUNTER — TELEPHONE (OUTPATIENT)
Dept: FAMILY MEDICINE CLINIC | Facility: CLINIC | Age: 44
End: 2024-11-14

## 2024-11-14 ENCOUNTER — OFFICE VISIT (OUTPATIENT)
Dept: FAMILY MEDICINE CLINIC | Facility: CLINIC | Age: 44
End: 2024-11-14
Payer: COMMERCIAL

## 2024-11-14 VITALS
WEIGHT: 167.4 LBS | SYSTOLIC BLOOD PRESSURE: 132 MMHG | OXYGEN SATURATION: 99 % | TEMPERATURE: 98.6 F | HEART RATE: 86 BPM | BODY MASS INDEX: 29.66 KG/M2 | HEIGHT: 63 IN | DIASTOLIC BLOOD PRESSURE: 90 MMHG

## 2024-11-14 DIAGNOSIS — E78.2 MIXED HYPERLIPIDEMIA: ICD-10-CM

## 2024-11-14 DIAGNOSIS — E66.3 OVERWEIGHT (BMI 25.0-29.9): ICD-10-CM

## 2024-11-14 DIAGNOSIS — Z23 NEED FOR INFLUENZA VACCINATION: ICD-10-CM

## 2024-11-14 DIAGNOSIS — F98.8 ATTENTION DEFICIT DISORDER (ADD) IN ADULT: ICD-10-CM

## 2024-11-14 DIAGNOSIS — R73.9 HYPERGLYCEMIA: Primary | ICD-10-CM

## 2024-11-14 PROCEDURE — 90471 IMMUNIZATION ADMIN: CPT | Performed by: STUDENT IN AN ORGANIZED HEALTH CARE EDUCATION/TRAINING PROGRAM

## 2024-11-14 PROCEDURE — 36415 COLL VENOUS BLD VENIPUNCTURE: CPT | Performed by: STUDENT IN AN ORGANIZED HEALTH CARE EDUCATION/TRAINING PROGRAM

## 2024-11-14 PROCEDURE — 99214 OFFICE O/P EST MOD 30 MIN: CPT | Performed by: STUDENT IN AN ORGANIZED HEALTH CARE EDUCATION/TRAINING PROGRAM

## 2024-11-14 PROCEDURE — 90656 IIV3 VACC NO PRSV 0.5 ML IM: CPT | Performed by: STUDENT IN AN ORGANIZED HEALTH CARE EDUCATION/TRAINING PROGRAM

## 2024-11-14 RX ORDER — LISDEXAMFETAMINE DIMESYLATE 40 MG/1
40 CAPSULE ORAL EVERY MORNING
Qty: 30 CAPSULE | Refills: 0 | Status: SHIPPED | OUTPATIENT
Start: 2024-11-14 | End: 2024-11-15

## 2024-11-14 NOTE — PROGRESS NOTES
Venipuncture Blood Specimen Collection  Venipuncture performed in left arm by Nini Shepherd MA with good hemostasis. Patient tolerated the procedure well without complications.   11/14/24   Nini Shepherd MA

## 2024-11-14 NOTE — PROGRESS NOTES
Artur Barton,   [facility] PRIMARY CARE  [address]  [place] [id number] [phone]    Subjective      Name Vianey Bonilla MRN [id number]    1980 AGE/SEX 44 y.o. / female      Chief Complaint Chief Complaint   Patient presents with    Attention deficit disorder (ADD) in adult     Follow up         Visit History for  2024    Vianey Bonilla is a 44 y.o. female who presented today for Attention deficit disorder (ADD) in adult (Follow up)       History of Present Illness    She reports that her Adderall medication does not seem to be effective, as she experiences increased fatigue when she does not take it. She has previously tried Vyvanse but found it too costly. She is considering changing her insurance plan due to the high deductible. She is interested in trying Vyvanse again, as her insurance now covers weight loss medication. Her Adderall prescription was recently refilled. Her last blood work was conducted in 2024.    She expresses concern about her weight, which has been increasing despite her efforts to manage it. She is worried about the potential development of diabetes, especially since her father was diagnosed with the condition about a month ago. She is not interested in taking weight loss medication but acknowledges that her weight is impacting her life. She has been using a standing desk and walking pad at work but has still gained weight since 2024.    She reports issues with her NuvaRing, describing a sensation akin to being kicked between the legs. This discomfort occurs when she inserts a new ring, leading her to suspect a problem with the ring itself. She has experienced this three times. She also mentions that her uterus is tilted far back, making it difficult to reach the opening. She plans to discuss this with her OB in 2025.    She is looking for a reunification therapist and was court-ordered to do Dialectical Behavior Therapy  "(DBT).    FAMILY HISTORY  Her father was diagnosed with diabetes about a month ago.       Medications and Allergies   Current Outpatient Medications   Medication Instructions    amphetamine-dextroamphetamine XR (Adderall XR) 30 MG 24 hr capsule 30 mg, Oral, Every Morning    buPROPion XL (WELLBUTRIN XL) 300 mg, Oral, Daily    etonogestrel-ethinyl estradiol (NUVARING) 0.12-0.015 MG/24HR vaginal ring INSERT VAGINALLY AND LEAVE IN PLACE FOR 3 CONSECUTIVE WEEKS, THEN REMOVE FOR 1 WEEK    pantoprazole (PROTONIX) 40 MG EC tablet TAKE 1 TABLET DAILY    propranolol (INDERAL) 40 mg, Oral, 3 Times Daily PRN    traZODone (DESYREL) 50 mg, Oral, Nightly    valACYclovir (VALTREX) 2,000 mg, Oral, 2 Times Daily     Allergies   Allergen Reactions    Ceclor [Cefaclor]     Percocet [Oxycodone-Acetaminophen] Hives    Sulfa Antibiotics       I have reviewed the above medications and allergies     Objective:      Vitals Vitals:    11/14/24 0852   BP: 132/90   BP Location: Left arm   Patient Position: Sitting   Cuff Size: Adult   Pulse: 86   Temp: 98.6 °F (37 °C)   TempSrc: Oral   SpO2: 99%   Weight: 75.9 kg (167 lb 6.4 oz)   Height: 160 cm (63\")     Body mass index is 29.65 kg/m².    Physical Exam  Vitals reviewed.   Constitutional:       General: She is not in acute distress.     Appearance: She is not ill-appearing.   Pulmonary:      Effort: Pulmonary effort is normal.   Psychiatric:         Mood and Affect: Mood normal.         Behavior: Behavior normal.         Thought Content: Thought content normal.         Judgment: Judgment normal.          Physical Exam       Results       Assessment/Plan   Issues Addressed/ Plan   Diagnosis Plan   1. Hyperglycemia  Hemoglobin A1c    Comprehensive metabolic panel      2. Attention deficit disorder (ADD) in adult        3. Mixed hyperlipidemia  Lipid panel      4. Need for influenza vaccination  Fluzone >6mos      5. Overweight (BMI 25.0-29.9)           Assessment & Plan  1. Attention Deficit " Hyperactivity Disorder (ADHD).  A prescription for Vyvanse 40 mg was provided, to be taken first thing in the morning before meals. This medication is intended to help with focus and act as an appetite suppressant. If the insurance does not cover both Vyvanse and Adderall, she will notify the office.     2. Weight Management.  Her weight has remained stable since the last visit, indicating a plateau. She was advised to aim for a weight loss of 2 to 5 pounds per month. She has been using a standing desk and walking pad at work to increase physical activity. Blood work was ordered to monitor her condition, particularly for diabetes and high blood pressure.    3. Contraception.  The issue with NuvaRing may be related to the quality of the rings, as some appear to be more potent than others. Nexplanon was suggested as an alternative contraceptive method. She will discuss this option with her OB in January.    4. Health Maintenance.  An influenza vaccine was administered during the visit.             There are no Patient Instructions on file for this visit.   Follow up  recommended No follow-ups on file.   - Dragon voice recognition software was utilized to complete this chart.  Every reasonable attempt was made to edit and correct the text, however some incorrect words may remain.   Artur Barton DO    Patient or patient representative verbalized consent for the use of Ambient Listening during the visit with  Artur Barton DO for chart documentation. 11/25/2024  00:31 EST

## 2024-11-14 NOTE — TELEPHONE ENCOUNTER
Just wanted to let you know that my insurance only covers 1/2 of this price. I have to pay $185.15 for a 30 day supply. Is there any other options that COULD possible be cheaper?

## 2024-11-15 LAB
ALBUMIN SERPL-MCNC: 4.3 G/DL (ref 3.5–5.2)
ALBUMIN/GLOB SERPL: 1.5 G/DL
ALP SERPL-CCNC: 81 U/L (ref 39–117)
ALT SERPL-CCNC: 20 U/L (ref 1–33)
AST SERPL-CCNC: 19 U/L (ref 1–32)
BILIRUB SERPL-MCNC: 0.4 MG/DL (ref 0–1.2)
BUN SERPL-MCNC: 7 MG/DL (ref 6–20)
BUN/CREAT SERPL: 7.8 (ref 7–25)
CALCIUM SERPL-MCNC: 9.3 MG/DL (ref 8.6–10.5)
CHLORIDE SERPL-SCNC: 105 MMOL/L (ref 98–107)
CHOLEST SERPL-MCNC: 173 MG/DL (ref 0–200)
CO2 SERPL-SCNC: 24.1 MMOL/L (ref 22–29)
CREAT SERPL-MCNC: 0.9 MG/DL (ref 0.57–1)
EGFRCR SERPLBLD CKD-EPI 2021: 80.5 ML/MIN/1.73
GLOBULIN SER CALC-MCNC: 2.8 GM/DL
GLUCOSE SERPL-MCNC: 83 MG/DL (ref 65–99)
HBA1C MFR BLD: 4.9 % (ref 4.8–5.6)
HDLC SERPL-MCNC: 49 MG/DL (ref 40–60)
LDLC SERPL CALC-MCNC: 88 MG/DL (ref 0–100)
POTASSIUM SERPL-SCNC: 4.2 MMOL/L (ref 3.5–5.2)
PROT SERPL-MCNC: 7.1 G/DL (ref 6–8.5)
SODIUM SERPL-SCNC: 137 MMOL/L (ref 136–145)
TRIGL SERPL-MCNC: 216 MG/DL (ref 0–150)
VLDLC SERPL CALC-MCNC: 36 MG/DL (ref 5–40)

## 2024-11-15 RX ORDER — DEXTROAMPHETAMINE SACCHARATE, AMPHETAMINE ASPARTATE MONOHYDRATE, DEXTROAMPHETAMINE SULFATE AND AMPHETAMINE SULFATE 7.5; 7.5; 7.5; 7.5 MG/1; MG/1; MG/1; MG/1
30 CAPSULE, EXTENDED RELEASE ORAL EVERY MORNING
Qty: 30 CAPSULE | Refills: 0 | Status: SHIPPED | OUTPATIENT
Start: 2024-11-15

## 2024-11-25 PROBLEM — E66.3 OVERWEIGHT (BMI 25.0-29.9): Status: ACTIVE | Noted: 2024-11-25

## 2024-11-25 PROBLEM — E78.2 MIXED HYPERLIPIDEMIA: Status: ACTIVE | Noted: 2024-11-25

## 2024-12-13 DIAGNOSIS — B00.2 RECURRENT ORAL HERPES SIMPLEX: Chronic | ICD-10-CM

## 2024-12-13 RX ORDER — VALACYCLOVIR HYDROCHLORIDE 1 G/1
2000 TABLET, FILM COATED ORAL 2 TIMES DAILY
Qty: 4 TABLET | Refills: 0 | Status: SHIPPED | OUTPATIENT
Start: 2024-12-13

## 2024-12-15 DIAGNOSIS — F98.8 ATTENTION DEFICIT DISORDER (ADD) IN ADULT: ICD-10-CM

## 2024-12-17 RX ORDER — DEXTROAMPHETAMINE SACCHARATE, AMPHETAMINE ASPARTATE MONOHYDRATE, DEXTROAMPHETAMINE SULFATE AND AMPHETAMINE SULFATE 7.5; 7.5; 7.5; 7.5 MG/1; MG/1; MG/1; MG/1
30 CAPSULE, EXTENDED RELEASE ORAL EVERY MORNING
Qty: 30 CAPSULE | Refills: 0 | Status: SHIPPED | OUTPATIENT
Start: 2024-12-17

## 2025-01-17 ENCOUNTER — APPOINTMENT (OUTPATIENT)
Dept: WOMENS IMAGING | Facility: HOSPITAL | Age: 45
End: 2025-01-17
Payer: COMMERCIAL

## 2025-01-17 PROCEDURE — G0279 TOMOSYNTHESIS, MAMMO: HCPCS | Performed by: RADIOLOGY

## 2025-01-17 PROCEDURE — 76642 ULTRASOUND BREAST LIMITED: CPT | Performed by: RADIOLOGY

## 2025-01-17 PROCEDURE — 77065 DX MAMMO INCL CAD UNI: CPT | Performed by: RADIOLOGY

## 2025-01-17 PROCEDURE — 77061 BREAST TOMOSYNTHESIS UNI: CPT | Performed by: RADIOLOGY

## 2025-01-20 ENCOUNTER — OFFICE VISIT (OUTPATIENT)
Dept: FAMILY MEDICINE CLINIC | Facility: CLINIC | Age: 45
End: 2025-01-20
Payer: COMMERCIAL

## 2025-01-20 VITALS
DIASTOLIC BLOOD PRESSURE: 102 MMHG | OXYGEN SATURATION: 97 % | HEIGHT: 63 IN | WEIGHT: 165 LBS | BODY MASS INDEX: 29.23 KG/M2 | HEART RATE: 114 BPM | SYSTOLIC BLOOD PRESSURE: 160 MMHG

## 2025-01-20 DIAGNOSIS — B00.2 RECURRENT ORAL HERPES SIMPLEX: Chronic | ICD-10-CM

## 2025-01-20 DIAGNOSIS — N20.0 RENAL CALCULI: ICD-10-CM

## 2025-01-20 DIAGNOSIS — F98.8 ATTENTION DEFICIT DISORDER (ADD) IN ADULT: Primary | ICD-10-CM

## 2025-01-20 PROCEDURE — 99214 OFFICE O/P EST MOD 30 MIN: CPT | Performed by: STUDENT IN AN ORGANIZED HEALTH CARE EDUCATION/TRAINING PROGRAM

## 2025-01-20 RX ORDER — PHENAZOPYRIDINE HYDROCHLORIDE 200 MG/1
200 TABLET, FILM COATED ORAL
COMMUNITY
Start: 2025-01-17 | End: 2025-01-20

## 2025-01-20 RX ORDER — DEXTROAMPHETAMINE SACCHARATE, AMPHETAMINE ASPARTATE, DEXTROAMPHETAMINE SULFATE AND AMPHETAMINE SULFATE 3.125; 3.125; 3.125; 3.125 MG/1; MG/1; MG/1; MG/1
12.5 TABLET ORAL DAILY
Qty: 30 TABLET | Refills: 0 | Status: SHIPPED | OUTPATIENT
Start: 2025-01-20

## 2025-01-20 RX ORDER — LEVOFLOXACIN 250 MG/1
250 TABLET, FILM COATED ORAL DAILY
COMMUNITY
Start: 2025-01-17 | End: 2025-01-20

## 2025-01-20 RX ORDER — VALACYCLOVIR HYDROCHLORIDE 1 G/1
2000 TABLET, FILM COATED ORAL 2 TIMES DAILY
Qty: 4 TABLET | Refills: 0 | Status: SHIPPED | OUTPATIENT
Start: 2025-01-20

## 2025-01-20 RX ORDER — LISDEXAMFETAMINE DIMESYLATE 50 MG/1
50 CAPSULE ORAL EVERY MORNING
Qty: 30 CAPSULE | Refills: 0 | Status: SHIPPED | OUTPATIENT
Start: 2025-01-20

## 2025-01-20 RX ORDER — ACETAMINOPHEN AND CODEINE PHOSPHATE 300; 30 MG/1; MG/1
1 TABLET ORAL
COMMUNITY
Start: 2025-01-11

## 2025-01-20 RX ORDER — OXYBUTYNIN CHLORIDE 10 MG/1
10 TABLET, EXTENDED RELEASE ORAL DAILY
COMMUNITY
Start: 2025-01-17 | End: 2025-01-31

## 2025-01-20 NOTE — PROGRESS NOTES
Artur Barton,   [facility] PRIMARY CARE  [address]  [place] [address]  [phone]    Subjective      Name Vianey Bonilla MRN [id number]    1980 AGE/SEX 44 y.o. / female      Chief Complaint Chief Complaint   Patient presents with    ADHD     Follow up on medication          Visit History for  2025    Vianey Bonilla is a 44 y.o. female who presented today for ADHD (Follow up on medication )       History of Present Illness    She has a history of nephrolithiasis, having previously passed a renal calculus without complications. However, she recently experienced a 4 mm stone lodged at the entrance of her bladder, necessitating an emergency room visit two  ago. Despite initial advice that passing the stone would resolve the issue, she returned to the ER on Thursday due to persistent discomfort. A stent was subsequently placed on Friday morning. She describes the sensation as if her kidney is on the verge of rupture with certain movements. Interestingly, she reports that her pain subsided upon arrival at the hospital, negating the need for analgesics. She has been straining her urine for the past week but has not observed any stone passage. She is scheduled for a CT scan on Thursday to assess the status of the stone. She also reports hematuria and incontinence, which she attributes to the stent placement. She expresses concern about the potential link between long-term stimulant use and kidney stone formation, as suggested by online sources. She manages her pain with Tylenol during the day and Tylenol 3 at night, describing the sensation more as pressure than pain. She maintains adequate hydration and is scheduled to start Levaquin on Thursday, the day before her stent removal.    She was prescribed Vyvanse in 2024 to aid in appetite control. She reports that the medication was partially effective but found the cost prohibitive. She trialed Vyvanse  "alone for a week without significant benefit but noticed improved focus and appetite suppression when she added Adderall to her regimen. She takes Vyvanse first thing in the morning, followed by Adderall an hour later. She has recently changed her insurance provider. She has lost approximately one pound since starting the medications.         Medications and Allergies   Current Outpatient Medications   Medication Instructions    acetaminophen-codeine (TYLENOL with CODEINE #3) 300-30 MG per tablet 1 tablet    amphetamine-dextroamphetamine (ADDERALL) 12.5 MG tablet 12.5 mg, Oral, Daily    buPROPion XL (WELLBUTRIN XL) 300 mg, Oral, Daily    etonogestrel-ethinyl estradiol (NUVARING) 0.12-0.015 MG/24HR vaginal ring INSERT VAGINALLY AND LEAVE IN PLACE FOR 3 CONSECUTIVE WEEKS, THEN REMOVE FOR 1 WEEK    lisdexamfetamine (VYVANSE) 50 mg, Oral, Every Morning    pantoprazole (PROTONIX) 40 MG EC tablet TAKE 1 TABLET DAILY    traZODone (DESYREL) 50 mg, Oral, Nightly    valACYclovir (VALTREX) 2,000 mg, Oral, 2 Times Daily     Allergies   Allergen Reactions    Milk (Cow) Anaphylaxis    Ceclor [Cefaclor]     Nsaids Other (See Comments)     ulcers    Percocet [Oxycodone-Acetaminophen] Hives    Sulfa Antibiotics       I have reviewed the above medications and allergies     Objective:      Vitals Vitals:    01/20/25 1400   BP: (!) 160/102   BP Location: Left arm   Patient Position: Sitting   Cuff Size: Adult   Pulse: 114   SpO2: 97%   Weight: 74.8 kg (165 lb)   Height: 160 cm (63\")     Body mass index is 29.23 kg/m².    Physical Exam  Vitals reviewed.   Constitutional:       General: She is not in acute distress.     Appearance: She is not ill-appearing.   Pulmonary:      Effort: Pulmonary effort is normal.   Psychiatric:         Mood and Affect: Mood normal.         Behavior: Behavior normal.         Thought Content: Thought content normal.         Judgment: Judgment normal.          Physical Exam       Results  Imaging  CT scan " showed a 4 mm kidney stone at the entrance of the bladder.     Assessment/Plan   Issues Addressed/ Plan   Diagnosis Plan   1. Attention deficit disorder (ADD) in adult  lisdexamfetamine (Vyvanse) 50 MG capsule    amphetamine-dextroamphetamine (ADDERALL) 12.5 MG tablet      2. Recurrent oral herpes simplex  valACYclovir (VALTREX) 1000 MG tablet      3. Renal calculi           Assessment & Plan  1. Renal calculus.  The patient's symptoms are consistent with a renal calculus, likely exacerbated by the placement of a stent. The presence of blood in her urine suggests potential trauma from the stent placement. She is advised to maintain adequate hydration, ensuring her urine is clear, and to monitor her daily water intake. This will help prevent the formation of future renal calculi. She will start Levaquin on 07/24/2024, the day before the stent removal, and continue until 07/26/2024. A CT scan is scheduled for Thursday to check if the stone has passed.    2. Medication management.  Her current regimen of Vyvanse 40 mg and Adderall extended-release will be adjusted. She will be transitioned to Vyvanse 50 mg in the morning and Adderall immediate-release 10 or 20 mg at lunchtime. This adjustment aims to improve her focus and appetite control while minimizing side effects.           There are no Patient Instructions on file for this visit.   Follow up  recommended Return in about 3 months (around 4/20/2025).   - Dragon voice recognition software was utilized to complete this chart.  Every reasonable attempt was made to edit and correct the text, however some incorrect words may remain.   Artur Barton DO    Patient or patient representative verbalized consent for the use of Ambient Listening during the visit with  Artur Barton DO for chart documentation. 2/1/2025  01:15 EST

## 2025-03-04 DIAGNOSIS — F98.8 ATTENTION DEFICIT DISORDER (ADD) IN ADULT: ICD-10-CM

## 2025-03-04 DIAGNOSIS — B00.2 RECURRENT ORAL HERPES SIMPLEX: Chronic | ICD-10-CM

## 2025-03-07 RX ORDER — LISDEXAMFETAMINE DIMESYLATE 50 MG/1
50 CAPSULE ORAL EVERY MORNING
Qty: 30 CAPSULE | Refills: 0 | Status: SHIPPED | OUTPATIENT
Start: 2025-03-07

## 2025-03-07 RX ORDER — VALACYCLOVIR HYDROCHLORIDE 1 G/1
2000 TABLET, FILM COATED ORAL 2 TIMES DAILY
Qty: 4 TABLET | Refills: 0 | Status: SHIPPED | OUTPATIENT
Start: 2025-03-07

## 2025-03-07 RX ORDER — DEXTROAMPHETAMINE SACCHARATE, AMPHETAMINE ASPARTATE, DEXTROAMPHETAMINE SULFATE AND AMPHETAMINE SULFATE 3.125; 3.125; 3.125; 3.125 MG/1; MG/1; MG/1; MG/1
12.5 TABLET ORAL DAILY
Qty: 30 TABLET | Refills: 0 | Status: SHIPPED | OUTPATIENT
Start: 2025-03-07

## 2025-03-17 DIAGNOSIS — Z30.44 ENCOUNTER FOR SURVEILLANCE OF VAGINAL RING HORMONAL CONTRACEPTIVE DEVICE: Chronic | ICD-10-CM

## 2025-03-17 RX ORDER — ETONOGESTREL AND ETHINYL ESTRADIOL VAGINAL RING .015; .12 MG/D; MG/D
RING VAGINAL SEE ADMIN INSTRUCTIONS
Qty: 3 EACH | Refills: 3 | Status: SHIPPED | OUTPATIENT
Start: 2025-03-17

## 2025-04-14 DIAGNOSIS — B00.2 RECURRENT ORAL HERPES SIMPLEX: Chronic | ICD-10-CM

## 2025-04-14 DIAGNOSIS — F98.8 ATTENTION DEFICIT DISORDER (ADD) IN ADULT: ICD-10-CM

## 2025-04-14 RX ORDER — LISDEXAMFETAMINE DIMESYLATE 50 MG/1
50 CAPSULE ORAL EVERY MORNING
Qty: 30 CAPSULE | Refills: 0 | Status: SHIPPED | OUTPATIENT
Start: 2025-04-14

## 2025-04-14 RX ORDER — DEXTROAMPHETAMINE SACCHARATE, AMPHETAMINE ASPARTATE, DEXTROAMPHETAMINE SULFATE AND AMPHETAMINE SULFATE 3.125; 3.125; 3.125; 3.125 MG/1; MG/1; MG/1; MG/1
12.5 TABLET ORAL DAILY
Qty: 30 TABLET | Refills: 0 | Status: SHIPPED | OUTPATIENT
Start: 2025-04-14

## 2025-04-14 RX ORDER — VALACYCLOVIR HYDROCHLORIDE 1 G/1
2000 TABLET, FILM COATED ORAL 2 TIMES DAILY
Qty: 4 TABLET | Refills: 0 | Status: SHIPPED | OUTPATIENT
Start: 2025-04-14

## 2025-04-23 ENCOUNTER — OFFICE VISIT (OUTPATIENT)
Dept: FAMILY MEDICINE CLINIC | Facility: CLINIC | Age: 45
End: 2025-04-23
Payer: COMMERCIAL

## 2025-04-23 VITALS
HEART RATE: 93 BPM | HEIGHT: 63 IN | OXYGEN SATURATION: 100 % | SYSTOLIC BLOOD PRESSURE: 128 MMHG | BODY MASS INDEX: 30.12 KG/M2 | WEIGHT: 170 LBS | DIASTOLIC BLOOD PRESSURE: 86 MMHG

## 2025-04-23 DIAGNOSIS — J30.2 SEASONAL ALLERGIES: Primary | ICD-10-CM

## 2025-04-23 DIAGNOSIS — F98.8 ATTENTION DEFICIT DISORDER (ADD) IN ADULT: ICD-10-CM

## 2025-04-23 DIAGNOSIS — E66.09 CLASS 1 OBESITY DUE TO EXCESS CALORIES WITHOUT SERIOUS COMORBIDITY WITH BODY MASS INDEX (BMI) OF 30.0 TO 30.9 IN ADULT: ICD-10-CM

## 2025-04-23 DIAGNOSIS — L50.9 HIVES: ICD-10-CM

## 2025-04-23 DIAGNOSIS — E66.811 CLASS 1 OBESITY DUE TO EXCESS CALORIES WITHOUT SERIOUS COMORBIDITY WITH BODY MASS INDEX (BMI) OF 30.0 TO 30.9 IN ADULT: ICD-10-CM

## 2025-04-23 PROCEDURE — 99214 OFFICE O/P EST MOD 30 MIN: CPT | Performed by: STUDENT IN AN ORGANIZED HEALTH CARE EDUCATION/TRAINING PROGRAM

## 2025-04-23 RX ORDER — CETIRIZINE HYDROCHLORIDE 10 MG/1
10 TABLET ORAL DAILY
Qty: 30 TABLET | Refills: 2 | Status: SHIPPED | OUTPATIENT
Start: 2025-04-23

## 2025-04-23 NOTE — PROGRESS NOTES
Artur Barton,   [facility] PRIMARY CARE  [address]  [place] [address] [phone]    Subjective      Name Vianey Bonilla MRN [id number]    1980 AGE/SEX 44 y.o. / female      Chief Complaint Chief Complaint   Patient presents with    ADD     3 Month med check, has concerns about medication effecting body.         Visit History for  2025    Vianey Bonilla is a 44 y.o. female who presented today for ADD (3 Month med check, has concerns about medication effecting body.)       History of Present Illness    The chief complaint includes difficulties with weight loss and nightly hives. She has been utilizing stimulants intermittently for the past 13 years, primarily for work-related purposes, and has observed that she does not require them during weekends. Breaks from stimulant use have been taken since she started the medication. Currently, she is on Adderall and Vyvanse, with a regimen that includes breaks every weekend. Trazodone is taken as needed.    Planning to get  this year, she expresses a desire to lose weight. Despite efforts, desired weight loss has not been achieved. Various weight loss strategies have been attempted, including the purchase of a treadmill and elliptical machine, and an 8:12 fasting regimen. A period of near-starvation is recalled, where the diet consisted solely of rice cakes with peanut butter and water. She believes she was 2 or 3 pounds lighter at her last visit. Typically, weight is maintained in the 120s, but fluctuations have occurred due to stress over the past 5.5 years. A recent increase in weight is noted, attributed to age and sleep patterns. Daily exercise is engaged in, aiming for 10,000 steps, and yoga is incorporated into the routine three times a week. Historically petite, carrying an additional 40 to 50 pounds is challenging. Consideration is given to GLP1 for weight loss, with curiosity about its coverage  under her insurance plan. A history of kidney stones and cysts in the kidneys is reported.    Nightly hives have been experienced for the past two weeks, typically around 9:00 PM. Suspected to be allergy-related, as there is a history of severe allergies and allergy injections received in youth. Trazodone use has been discontinued, and All Free and Clear detergent is used for sheets and blankets. Half a tablet of Children's Benadryl is taken but found to be too sedating. Claritin has also been tried but resulted in similar drowsiness. Observation is made that hives do not occur on days when a tanning bed is used. The hives typically start on the legs and then spread to the back. Soda has been abstained from since October.       Medications and Allergies   Current Outpatient Medications   Medication Instructions    acetaminophen-codeine (TYLENOL with CODEINE #3) 300-30 MG per tablet 1 tablet    amphetamine-dextroamphetamine (ADDERALL) 12.5 MG tablet 12.5 mg, Oral, Daily    buPROPion XL (WELLBUTRIN XL) 300 mg, Oral, Daily    cetirizine (ZYRTEC) 10 mg, Oral, Daily    etonogestrel-ethinyl estradiol (NUVARING) 0.12-0.015 MG/24HR vaginal ring Vaginal, See Admin Instructions, Insert vaginally and leave in place for 3 consecutive weeks, then remove for 1 week    lisdexamfetamine (VYVANSE) 50 mg, Oral, Every Morning    pantoprazole (PROTONIX) 40 MG EC tablet TAKE 1 TABLET DAILY    Tirzepatide-Weight Management (ZEPBOUND) 2.5 mg, Subcutaneous, Weekly    traZODone (DESYREL) 50 mg, Oral, Nightly    valACYclovir (VALTREX) 2,000 mg, Oral, 2 Times Daily     Allergies   Allergen Reactions    Milk (Cow) Anaphylaxis    Ceclor [Cefaclor]     Nsaids Other (See Comments)     ulcers    Percocet [Oxycodone-Acetaminophen] Hives    Sulfa Antibiotics       I have reviewed the above medications and allergies     Objective:      Vitals Vitals:    04/23/25 0810   BP: 128/86   BP Location: Left arm   Patient Position: Sitting   Cuff Size: Adult  "  Pulse: 93   SpO2: 100%   Weight: 77.1 kg (170 lb)   Height: 160 cm (62.99\")     Body mass index is 30.12 kg/m².    Physical Exam  Vitals reviewed.   Constitutional:       General: She is not in acute distress.     Appearance: She is not ill-appearing.   Pulmonary:      Effort: Pulmonary effort is normal.   Psychiatric:         Mood and Affect: Mood normal.         Behavior: Behavior normal.         Thought Content: Thought content normal.         Judgment: Judgment normal.          Physical Exam       Results       Assessment/Plan   Issues Addressed/ Plan   Diagnosis Plan   1. Seasonal allergies  cetirizine (zyrTEC) 10 MG tablet    Ambulatory Referral to Allergy      2. Hives  Ambulatory Referral to Allergy      3. Attention deficit disorder (ADD) in adult        4. Class 1 obesity due to excess calories without serious comorbidity with body mass index (BMI) of 30.0 to 30.9 in adult  Tirzepatide-Weight Management (ZEPBOUND) 2.5 MG/0.5ML solution auto-injector         Assessment & Plan  ADHD   - She has been reassured that the use of stimulants will not have a significant impact on her long-term health when they are taken correctly and with   - She has been advised to continue her current regimen of Adderall and Vyvanse, ensuring to take breaks during weekends.  - Prescription drug monitoring program has been referenced to confirm that she has not requested early refills.  - She has been encouraged to maintain her current medication schedule and to use reminders to aid in adherence.    Weight management.  - Her recent weight loss is considered healthy, with a gradual decrease of approximately 2 pounds per interval, but seems to have stalled and no longer having success.  Has been using diet and exercise for the last 6 months.      - She has been informed that her weight gain is likely due to stress rather than age. She has been advised to continue to maintain a nutritious diet, of adequate protein, carbohydrates, " and sugars.  No further adjustments to diet needed as diet alone has not been effective.    - Continue to exercise to 30 minutes of walking or 15 to 20 minutes on the elliptical machine. She has also been recommended to continue with yoga and Pilates.  - Would like to consider weight loss medication such as GLP-1, but need to make sure that it is covered by insurance.    Hives.  - Her symptoms suggest an allergic reaction, possibly indicative of an immune response.  - She has been advised to take Zyrtec daily, which should not induce drowsiness.  - If necessary, she may supplement with Benadryl 25 mg.  - Referral to an allergist has been offered if symptoms persist or worsen.    Follow-up  The patient will follow up in 3 months.          There are no Patient Instructions on file for this visit.   Follow up  recommended Return in about 3 months (around 7/23/2025).   - Dragon voice recognition software was utilized to complete this chart.  Every reasonable attempt was made to edit and correct the text, however some incorrect words may remain.   Artur Barton DO    Patient or patient representative verbalized consent for the use of Ambient Listening during the visit with  Artur Barton DO for chart documentation. 5/6/2025  15:40 EDT

## 2025-04-24 ENCOUNTER — PATIENT MESSAGE (OUTPATIENT)
Dept: FAMILY MEDICINE CLINIC | Facility: CLINIC | Age: 45
End: 2025-04-24
Payer: COMMERCIAL

## 2025-04-24 ENCOUNTER — TELEPHONE (OUTPATIENT)
Dept: FAMILY MEDICINE CLINIC | Facility: CLINIC | Age: 45
End: 2025-04-24
Payer: COMMERCIAL

## 2025-05-06 NOTE — TELEPHONE ENCOUNTER
Will try to send Zepbound, this is the weight loss equivalent to Mounjaro.  It will just depend on insurance coverage.

## 2025-05-15 DIAGNOSIS — F98.8 ATTENTION DEFICIT DISORDER (ADD) IN ADULT: ICD-10-CM

## 2025-05-15 DIAGNOSIS — B00.2 RECURRENT ORAL HERPES SIMPLEX: Chronic | ICD-10-CM

## 2025-05-15 RX ORDER — VALACYCLOVIR HYDROCHLORIDE 1 G/1
2000 TABLET, FILM COATED ORAL 2 TIMES DAILY
Qty: 4 TABLET | Refills: 0 | Status: SHIPPED | OUTPATIENT
Start: 2025-05-15

## 2025-05-19 RX ORDER — DEXTROAMPHETAMINE SACCHARATE, AMPHETAMINE ASPARTATE, DEXTROAMPHETAMINE SULFATE AND AMPHETAMINE SULFATE 3.125; 3.125; 3.125; 3.125 MG/1; MG/1; MG/1; MG/1
12.5 TABLET ORAL DAILY
Qty: 30 TABLET | Refills: 0 | Status: SHIPPED | OUTPATIENT
Start: 2025-05-19

## 2025-05-19 RX ORDER — LISDEXAMFETAMINE DIMESYLATE 50 MG/1
50 CAPSULE ORAL EVERY MORNING
Qty: 30 CAPSULE | Refills: 0 | Status: SHIPPED | OUTPATIENT
Start: 2025-05-19

## 2025-05-20 DIAGNOSIS — Z87.11 HISTORY OF GASTRIC ULCER: ICD-10-CM

## 2025-05-20 RX ORDER — PANTOPRAZOLE SODIUM 40 MG/1
40 TABLET, DELAYED RELEASE ORAL DAILY
Qty: 90 TABLET | Refills: 3 | Status: SHIPPED | OUTPATIENT
Start: 2025-05-20

## 2025-06-22 DIAGNOSIS — F98.8 ATTENTION DEFICIT DISORDER (ADD) IN ADULT: ICD-10-CM

## 2025-06-22 DIAGNOSIS — B00.2 RECURRENT ORAL HERPES SIMPLEX: Chronic | ICD-10-CM

## 2025-06-23 RX ORDER — LISDEXAMFETAMINE DIMESYLATE 50 MG/1
50 CAPSULE ORAL EVERY MORNING
Qty: 30 CAPSULE | Refills: 0 | Status: SHIPPED | OUTPATIENT
Start: 2025-06-23

## 2025-06-23 RX ORDER — DEXTROAMPHETAMINE SACCHARATE, AMPHETAMINE ASPARTATE, DEXTROAMPHETAMINE SULFATE AND AMPHETAMINE SULFATE 3.125; 3.125; 3.125; 3.125 MG/1; MG/1; MG/1; MG/1
12.5 TABLET ORAL DAILY
Qty: 30 TABLET | Refills: 0 | Status: SHIPPED | OUTPATIENT
Start: 2025-06-23

## 2025-06-23 RX ORDER — VALACYCLOVIR HYDROCHLORIDE 1 G/1
2000 TABLET, FILM COATED ORAL 2 TIMES DAILY
Qty: 4 TABLET | Refills: 0 | Status: SHIPPED | OUTPATIENT
Start: 2025-06-23

## 2025-07-11 ENCOUNTER — OFFICE VISIT (OUTPATIENT)
Dept: FAMILY MEDICINE CLINIC | Facility: CLINIC | Age: 45
End: 2025-07-11
Payer: COMMERCIAL

## 2025-07-11 VITALS
SYSTOLIC BLOOD PRESSURE: 120 MMHG | HEART RATE: 94 BPM | HEIGHT: 63 IN | OXYGEN SATURATION: 98 % | BODY MASS INDEX: 26.93 KG/M2 | DIASTOLIC BLOOD PRESSURE: 86 MMHG | WEIGHT: 152 LBS | RESPIRATION RATE: 16 BRPM

## 2025-07-11 DIAGNOSIS — K21.9 GASTROESOPHAGEAL REFLUX DISEASE, UNSPECIFIED WHETHER ESOPHAGITIS PRESENT: ICD-10-CM

## 2025-07-11 DIAGNOSIS — E66.3 OVERWEIGHT (BMI 25.0-29.9): Primary | ICD-10-CM

## 2025-07-11 PROCEDURE — 99213 OFFICE O/P EST LOW 20 MIN: CPT | Performed by: STUDENT IN AN ORGANIZED HEALTH CARE EDUCATION/TRAINING PROGRAM

## 2025-07-21 NOTE — PROGRESS NOTES
Artur Barton,   [facility]  [address]  [place] [id number] [phone]    Subjective      Name Vianey Bonilla MRN [id number]    1980 AGE/SEX 45 y.o. / female      Chief Complaint Chief Complaint   Patient presents with    ADD     3 month check up     Heartburn     Doesn't feel like medication is helping, would like to increase dosage          Visit History for  2025    Vianey Bonilla is a 45 y.o. female who presented today for ADD (3 month check up ) and Heartburn (Doesn't feel like medication is helping, would like to increase dosage )       History of Present Illness    She has experienced a weight loss of 18 pounds, which she attributes to her current medication regimen. She reports no debilitating side effects from the medication, though she does experience fatigue and queasiness similar to pregnancy symptoms. Her diet primarily consists of chicken, apples, peanut butter, pretzels, and saltines. She avoids heavy foods and struggles to finish meals. She also consumes vanilla protein powder, blending it and refrigerating it before drinking. She is currently on a 1 mg dose of her medication, with the first dose administered on 2025 and the second on 2025. She prefers to inject the medication into her arm but has also tried injecting it into her stomach, which resulted in increased nausea. She has noticed a change in her taste buds since starting the medication, finding certain foods and drinks, like orange Gatorade, unappetizing.    She experiences severe heartburn, which she describes as radiating through her kneecap. The heartburn persists throughout the day. She is currently taking Protonix and has expressed a desire to increase the dosage, occasionally doubling it. She also takes Pepcid and Tums, with Pepcid being taken in the morning due to its faster-acting nature.    She has been under a lot of stress lately, including  "dealing with legal issues and moving to a new residence.       Medications and Allergies   Current Outpatient Medications   Medication Instructions    acetaminophen-codeine (TYLENOL with CODEINE #3) 300-30 MG per tablet 1 tablet    amphetamine-dextroamphetamine (ADDERALL) 12.5 MG tablet 12.5 mg, Oral, Daily    buPROPion XL (WELLBUTRIN XL) 300 mg, Oral, Daily    etonogestrel-ethinyl estradiol (NUVARING) 0.12-0.015 MG/24HR vaginal ring Vaginal, See Admin Instructions, Insert vaginally and leave in place for 3 consecutive weeks, then remove for 1 week    lisdexamfetamine (VYVANSE) 50 mg, Oral, Every Morning    pantoprazole (PROTONIX) 40 mg, Oral, Daily    Semaglutide-Weight Management 0.5 MG/0.5ML solution auto-injector Inject  under the skin into the appropriate area as directed.    traZODone (DESYREL) 50 mg, Oral, Nightly    valACYclovir (VALTREX) 2,000 mg, Oral, 2 Times Daily     Allergies   Allergen Reactions    Milk (Cow) Anaphylaxis    Ceclor [Cefaclor]     Nsaids Other (See Comments)     ulcers    Percocet [Oxycodone-Acetaminophen] Hives    Sulfa Antibiotics       I have reviewed the above medications and allergies     Objective:      Vitals Vitals:    07/11/25 1036   BP: 120/86   BP Location: Right arm   Patient Position: Sitting   Cuff Size: Adult   Pulse: 94   Resp: 16   SpO2: 98%   Weight: 68.9 kg (152 lb)   Height: 160 cm (62.99\")     Body mass index is 26.93 kg/m².    Physical Exam  Vitals reviewed.   Constitutional:       General: She is not in acute distress.     Appearance: She is not ill-appearing.   Pulmonary:      Effort: Pulmonary effort is normal.   Psychiatric:         Mood and Affect: Mood normal.         Behavior: Behavior normal.         Thought Content: Thought content normal.         Judgment: Judgment normal.          Physical Exam       Results       Assessment/Plan   Issues Addressed/ Plan   Diagnosis Plan   1. Overweight (BMI 25.0-29.9)        2. Gastroesophageal reflux disease, " unspecified whether esophagitis present           Assessment & Plan  1. Weight loss.  - Lost 18 pounds and is currently on a 1 mg dose of medication.  - Reports fatigue and heartburn as side effects, similar to pregnancy symptoms.  - Advised to continue current medication regimen and incorporate protein into diet to prevent muscle loss.  - Advised to avoid injecting medication into the stomach to reduce nausea.    2. Heartburn.  - Experiences heartburn radiating through the kneecap.  - Currently on Protonix at its maximum dose.  - Advised to take Pepcid 20 mg in the morning and use Tums as needed.  - Can take an additional dose of Pepcid on particularly bad days if symptoms persist.           There are no Patient Instructions on file for this visit.   Follow up  recommended Return in about 3 months (around 10/11/2025).   - Dragon voice recognition software was utilized to complete this chart.  Every reasonable attempt was made to edit and correct the text, however some incorrect words may remain.   Artur Barton DO    Patient or patient representative verbalized consent for the use of Ambient Listening during the visit with  Artur Barton DO for chart documentation. 7/21/2025  07:56 EDT

## 2025-08-02 DIAGNOSIS — F98.8 ATTENTION DEFICIT DISORDER (ADD) IN ADULT: ICD-10-CM

## 2025-08-05 DIAGNOSIS — F98.8 ATTENTION DEFICIT DISORDER (ADD) IN ADULT: ICD-10-CM

## 2025-08-05 RX ORDER — LISDEXAMFETAMINE DIMESYLATE 50 MG/1
50 CAPSULE ORAL EVERY MORNING
Qty: 30 CAPSULE | Refills: 0 | Status: SHIPPED | OUTPATIENT
Start: 2025-08-05

## 2025-08-05 RX ORDER — DEXTROAMPHETAMINE SACCHARATE, AMPHETAMINE ASPARTATE, DEXTROAMPHETAMINE SULFATE AND AMPHETAMINE SULFATE 3.125; 3.125; 3.125; 3.125 MG/1; MG/1; MG/1; MG/1
1 TABLET ORAL DAILY
Qty: 30 TABLET | Refills: 0 | OUTPATIENT
Start: 2025-08-05

## 2025-08-05 RX ORDER — DEXTROAMPHETAMINE SACCHARATE, AMPHETAMINE ASPARTATE, DEXTROAMPHETAMINE SULFATE AND AMPHETAMINE SULFATE 3.125; 3.125; 3.125; 3.125 MG/1; MG/1; MG/1; MG/1
12.5 TABLET ORAL DAILY
Qty: 30 TABLET | Refills: 0 | Status: SHIPPED | OUTPATIENT
Start: 2025-08-05

## 2025-08-05 RX ORDER — LISDEXAMFETAMINE DIMESYLATE 50 MG/1
50 CAPSULE ORAL EVERY MORNING
Qty: 30 CAPSULE | Refills: 0 | OUTPATIENT
Start: 2025-08-05